# Patient Record
Sex: FEMALE | Race: WHITE | URBAN - METROPOLITAN AREA
[De-identification: names, ages, dates, MRNs, and addresses within clinical notes are randomized per-mention and may not be internally consistent; named-entity substitution may affect disease eponyms.]

---

## 2017-02-26 ENCOUNTER — OFFICE VISIT (OUTPATIENT)
Dept: URGENT CARE | Facility: URGENT CARE | Age: 42
End: 2017-02-26
Payer: COMMERCIAL

## 2017-02-26 VITALS
OXYGEN SATURATION: 99 % | SYSTOLIC BLOOD PRESSURE: 113 MMHG | HEART RATE: 92 BPM | DIASTOLIC BLOOD PRESSURE: 66 MMHG | WEIGHT: 114 LBS | TEMPERATURE: 99.1 F

## 2017-02-26 DIAGNOSIS — K61.1 PERIRECTAL ABSCESS: Primary | ICD-10-CM

## 2017-02-26 PROCEDURE — 99203 OFFICE O/P NEW LOW 30 MIN: CPT | Performed by: FAMILY MEDICINE

## 2017-02-26 RX ORDER — CLINDAMYCIN HCL 300 MG
300 CAPSULE ORAL 4 TIMES DAILY
Qty: 40 CAPSULE | Refills: 0 | Status: SHIPPED | OUTPATIENT
Start: 2017-02-26 | End: 2017-07-14

## 2017-02-26 RX ORDER — TOPIRAMATE 25 MG/1
TABLET, FILM COATED ORAL 2 TIMES DAILY
COMMUNITY
End: 2017-07-14

## 2017-02-26 NOTE — MR AVS SNAPSHOT
After Visit Summary   2/26/2017    Clau Jones    MRN: [id number]           Patient Information     Date Of Birth          1975        Visit Information        Provider Department      2/26/2017 12:05 PM Dar Iniguez MD [facility]        Today's Diagnoses     Perirectal cellulitis/abscess    -  1      Care Instructions      Perianal Abscess, Antibiotic Treatment     Glands near the anus can become blocked. This can lead to infection. If the infection cannot drain, a collection of pus called an abscess may form. Symptoms of an abscess include pain, itching, swelling, and fever.  Antibiotics are given to treat the infection. If the infection does not improve with antibiotic treatment, the abscess will have to be drained by a healthcare provider.  Home care    Take all of the antibiotic medicine as prescribed. Continue it even if you start feeling better. All of the medicine should be finished unless your healthcare provider tells you to stop.    Try sitz baths. Sit in a tub filled with about 6 inches of hot water for 15-30 minutes. (Test the water temperature before sitting down to ensure it will not burn you.) Repeat this twice a day until pain is relieved.    Unless a pain medicine has been prescribed, you may take an over-the-counter medicine, such as ibuprofen, for pain.     Passing stools may be painful. If so, ask your healthcare provider about using a stool-softener for a short time.  Follow-up care  Follow up with your doctor or as advised by our staff.  When to seek medical advice  Call your health care provider right away if any of the following occur:    Increasing pain, swelling, or redness    Pus draining from the abscess    Fever of 100.4 F (38 C) or higher that continues for a day after starting antibiotics, or as directed by your healthcare provider    0440-1784 [employer]. [address] [place] [address], [place] [address]. All rights reserved.  "This information is not intended as a substitute for professional medical care. Always follow your healthcare professional's instructions.              Follow-ups after your visit        Who to contact     If you have questions or need follow up information about today's clinic visit or your schedule please contact [facility] directly at [phone].  Normal or non-critical lab and imaging results will be communicated to you by MyChart, letter or phone within 4 business days after the clinic has received the results. If you do not hear from us within 7 days, please contact the clinic through MyChart or phone. If you have a critical or abnormal lab result, we will notify you by phone as soon as possible.  Submit refill requests through [employer] or call your pharmacy and they will forward the refill request to us. Please allow 3 business days for your refill to be completed.          Additional Information About Your Visit        MyChart Information     [employer] lets you send messages to your doctor, view your test results, renew your prescriptions, schedule appointments and more. To sign up, go to [email or web address] . Click on \"Log in\" on the left side of the screen, which will take you to the Welcome page. Then click on \"Sign up Now\" on the right side of the page.     You will be asked to enter the access code listed below, as well as some personal information. Please follow the directions to create your username and password.     Your access code is: [email or web address]  Expires: 2017 12:53 PM     Your access code will  in 90 days. If you need help or a new code, please call your [facility] or [phone].        Care EveryWhere ID     This is your Care EveryWhere ID. This could be used by other organizations to access your [place] medical records  [id number]        Your Vitals Were     Pulse Temperature Pulse Oximetry             92 99.1  F (37.3  C) (Oral) 99%          Blood " Pressure from Last 3 Encounters:   02/26/17 113/66    Weight from Last 3 Encounters:   02/26/17 114 lb (51.7 kg)              Today, you had the following     No orders found for display       Primary Care Provider Office Phone # Fax #    [facility] [place] [phone] [address] [facility] [address]        Thank you!     Thank you for choosing [facility]  for your care. Our goal is always to provide you with excellent care. Hearing back from our patients is one way we can continue to improve our services. Please take a few minutes to complete the written survey that you may receive in the mail after your visit with us. Thank you!             Your Updated Medication List - Protect others around you: Learn how to safely use, store and throw away your medicines at www.disposemymeds.org.          This list is accurate as of: 2/26/17 12:53 PM.  Always use your most recent med list.                   Brand Name Dispense Instructions for use    TOPAMAX 25 MG tablet   Generic drug:  topiramate      Take by mouth 2 times daily

## 2017-02-26 NOTE — PATIENT INSTRUCTIONS
Perianal Abscess, Antibiotic Treatment     Glands near the anus can become blocked. This can lead to infection. If the infection cannot drain, a collection of pus called an abscess may form. Symptoms of an abscess include pain, itching, swelling, and fever.  Antibiotics are given to treat the infection. If the infection does not improve with antibiotic treatment, the abscess will have to be drained by a healthcare provider.  Home care    Take all of the antibiotic medicine as prescribed. Continue it even if you start feeling better. All of the medicine should be finished unless your healthcare provider tells you to stop.    Try sitz baths. Sit in a tub filled with about 6 inches of hot water for 15-30 minutes. (Test the water temperature before sitting down to ensure it will not burn you.) Repeat this twice a day until pain is relieved.    Unless a pain medicine has been prescribed, you may take an over-the-counter medicine, such as ibuprofen, for pain.     Passing stools may be painful. If so, ask your healthcare provider about using a stool-softener for a short time.  Follow-up care  Follow up with your doctor or as advised by our staff.  When to seek medical advice  Call your health care provider right away if any of the following occur:    Increasing pain, swelling, or redness    Pus draining from the abscess    Fever of 100.4 F (38 C) or higher that continues for a day after starting antibiotics, or as directed by your healthcare provider    9117-5884 [employer] [address] [place] [address]. All rights reserved. This information is not intended as a substitute for professional medical care. Always follow your healthcare professional's instructions.

## 2017-02-26 NOTE — PROGRESS NOTES
SUBJECTIVE:                                                    Clau Jones is a 41 year old female who presents to clinic today for the following health issues:    Rectal Pain       Duration: 1 week     Description (location/character/radiation): rectal     Intensity:  severe    Accompanying signs and symptoms: quarter size bump    History (similar episodes/previous evaluation): hx of hemorrhoids     Precipitating or alleviating factors: None    Therapies tried and outcome: hydrocortisone and ice with no relief     Leaving to [place] for work tomorrow      Painful swelling in the butt on the left side. Been hard to sit.    Problem list and histories reviewed & adjusted, as indicated.  Additional history: as documented    There is no problem list on file for this patient.    No past surgical history on file.    Social History   Substance Use Topics     Smoking status: Not on file     Smokeless tobacco: Not on file     Alcohol use Not on file     No family history on file.        ROS:  Constitutional, HEENT, cardiovascular, pulmonary and gu systems are negative, except as otherwise noted.    OBJECTIVE:                                                    /66  Pulse 92  Temp 99.1  F (37.3  C) (Oral)  Wt 114 lb (51.7 kg)  SpO2 99%  There is no height or weight on file to calculate BMI.  GENERAL: healthy, alert and no distress  NECK: no adenopathy and thyroid normal to palpation  RESP: lungs clear to auscultation - no rales, rhonchi or wheezes  CV: regular rate and rhythm, no murmur, click or rub,  RECTAL (female):     Tender induration on the left upper perianal area, about 2 in x 2 in with deep induration, no fluctuance.  normal sphincter tone, no rectal masses  MS: no gross musculoskeletal defects noted, no edema       ASSESSMENT/PLAN:                                                    (K61.1) Perirectal cellulitis/abscess, Active  (primary encounter diagnosis)  Comment: Treatment with antibiotic.  Discussed the possibility of this evolving into and abscess which might require I and D. Meanwhile will do antibiotic, if worsens will get in to be seen otherwise follow with surgery towards end of the week.  Plan: clindamycin (CLEOCIN) 300 MG capsule, GENERAL         SURG ADULT REFERRAL    Dar Iniguez MD  [facility]

## 2017-02-26 NOTE — NURSING NOTE
Chief Complaint   Patient presents with     Rectal Problem       Initial /66  Pulse 92  Temp 99.1  F (37.3  C) (Oral)  Wt 114 lb (51.7 kg)  SpO2 99% There is no height or weight on file to calculate BMI.  Medication Reconciliation: complete     LUDIVINA Oseguera

## 2017-02-28 ENCOUNTER — TELEPHONE (OUTPATIENT)
Dept: FAMILY MEDICINE | Facility: CLINIC | Age: 42
End: 2017-02-28

## 2017-02-28 NOTE — TELEPHONE ENCOUNTER
Patient has sore lump on backside. Was seen in [facility] and given Clindamyacin. She is out of town now in [place] and it seems to be getting worse. Please advise. Ok to leave a message.

## 2017-02-28 NOTE — TELEPHONE ENCOUNTER
Patient called back and left voicemail asking if abscess DOES start to drain on it's own, what warning things should she watch for.  Called patient back and left detailed voicemail with below information that is highlighted.  Advised to call back main number if any further questions.   Ara Galindo RN      Perianal Abscess, Incision and Drainage  Glands near the anus can become blocked. This can lead to infection. If the infection cannot drain, a collection of pus called an abscess may form. Symptoms of an abscess include pain, itching, swelling, and fever.  Treatment of this infection has required an incision to drain the pus from the abscess. A gauze packing may have been put into the abscess opening. This should be removed within 1-2 days. if it falls out sooner, do not try to put it back in. Treatment with antibiotics may or may not be needed.  Healing of the wound will take about 1 to 2 weeks, depending on the size of the abscess.  Home care    The wound may drain for the first several days. Cover the wound with a clean dry bandage. Change the dressing if it becomes soaked with blood or pus, or soiled with feces.    If gauze packing was placed inside the abscess cavity, you may be told to remove it yourself. Do this only do it if the doctor told you to. You may do this in the shower. Once the packing is removed, wash the area carefully once a day until the skin opening has closed.     Try sitz baths. Sit in a tub filled with about 6 inches of hot water for 15-30 minutes. (Test the water temperature before sitting down to ensure it will not burn you.) Repeat this twice a day until pain is relieved.    If you were prescribed antibiotics, take all of the medicine as prescribed. Continue it even if you start feeling better. All of the medicine should be finished unless your healthcare provider tells you to stop.    Unless a pain medicine has been prescribed, you may take an over-the-counter medicine, such as  ibuprofen, for pain.    Passing stools may be painful. If so, ask your healthcare provider about using a stool-softener for a short time.  Follow-up care  Follow up with your healthcare provider as advised by our staff.  When to seek medical advice  Call your health care provider if any of the following occur:    Increasing pain, swelling, or redness    Pus continuing to drain from the wound 5 days after the incision    Fever of 100.4 F (38 C) or higher, or as directed by your healthcare provider    2284-9931 [employer]. [address] [place] [address], [place] [address]. All rights reserved. This information is not intended as a substitute for professional medical care. Always follow your healthcare professional's instructions.

## 2017-02-28 NOTE — TELEPHONE ENCOUNTER
Ok per patient to leave detailed voicemail on her cell if she is unable to answer.     Called patient and left detailed voicemail with providers message below.  If questions, patient can call clinic back.    Ara Galindo RN

## 2017-02-28 NOTE — TELEPHONE ENCOUNTER
Warm tub soaks or go to an Urgent Care in [facility].They may be able to give her pain meds Not much we can do from here.    Nohemy Gandhi PA-C

## 2017-02-28 NOTE — TELEPHONE ENCOUNTER
Patient called back and states that she was seen 2 days ago for hard lump in buttock area.  Provider said it had not yet turned into an abscess.  She has been taking clindamycin x 2 day. Afebrile so far but having some chills.     This morning patient woke up and area has now developed an abscess that is bigger, and a white/greyish area that is soft in the center.  It is much more painful today than the last 2 days.    Patient in [place] for work and forced to sit in meetings all day which are extremely painful for her. Ibuprofen is not improving the pain at all.   Patient has surgery consult scheduled this Friday for consult on abscess but needs to finish out meetings in [place] until she is able to come back. Very uncomfortable riding in plane also.    Routing to provider to advise if there is something that can be done to get her to this Friday to see surgery or does she need to go to ER out there?    Ara Galindo RN

## 2017-02-28 NOTE — TELEPHONE ENCOUNTER
Left message on voicemail for patient to call back and leave details on voicemail if unable to reach RN.  Direct number given.     Ara Galindo RN

## 2017-03-03 ENCOUNTER — OFFICE VISIT (OUTPATIENT)
Dept: FAMILY MEDICINE | Facility: CLINIC | Age: 42
End: 2017-03-03
Payer: COMMERCIAL

## 2017-03-03 ENCOUNTER — OFFICE VISIT (OUTPATIENT)
Dept: SURGERY | Facility: CLINIC | Age: 42
End: 2017-03-03
Payer: COMMERCIAL

## 2017-03-03 VITALS
BODY MASS INDEX: 19.29 KG/M2 | WEIGHT: 120 LBS | HEART RATE: 86 BPM | SYSTOLIC BLOOD PRESSURE: 119 MMHG | HEIGHT: 66 IN | DIASTOLIC BLOOD PRESSURE: 77 MMHG

## 2017-03-03 VITALS
OXYGEN SATURATION: 100 % | TEMPERATURE: 99.1 F | RESPIRATION RATE: 14 BRPM | HEIGHT: 66 IN | DIASTOLIC BLOOD PRESSURE: 66 MMHG | SYSTOLIC BLOOD PRESSURE: 116 MMHG | BODY MASS INDEX: 19.29 KG/M2 | WEIGHT: 120 LBS | HEART RATE: 64 BPM

## 2017-03-03 DIAGNOSIS — Z01.818 PREOP GENERAL PHYSICAL EXAM: Primary | ICD-10-CM

## 2017-03-03 DIAGNOSIS — K61.0 PERIANAL ABSCESS: Primary | ICD-10-CM

## 2017-03-03 LAB
ANION GAP SERPL CALCULATED.3IONS-SCNC: 10 MMOL/L (ref 3–14)
BUN SERPL-MCNC: 11 MG/DL (ref 7–30)
CALCIUM SERPL-MCNC: 9.3 MG/DL (ref 8.5–10.1)
CHLORIDE SERPL-SCNC: 109 MMOL/L (ref 94–109)
CO2 SERPL-SCNC: 24 MMOL/L (ref 20–32)
CREAT SERPL-MCNC: 0.86 MG/DL (ref 0.52–1.04)
GFR SERPL CREATININE-BSD FRML MDRD: 73 ML/MIN/1.7M2
GLUCOSE SERPL-MCNC: 97 MG/DL (ref 70–99)
HGB BLD-MCNC: 10.9 G/DL (ref 11.7–15.7)
POTASSIUM SERPL-SCNC: 4.2 MMOL/L (ref 3.4–5.3)
SODIUM SERPL-SCNC: 143 MMOL/L (ref 133–144)

## 2017-03-03 PROCEDURE — 36415 COLL VENOUS BLD VENIPUNCTURE: CPT | Performed by: INTERNAL MEDICINE

## 2017-03-03 PROCEDURE — 85018 HEMOGLOBIN: CPT | Performed by: INTERNAL MEDICINE

## 2017-03-03 PROCEDURE — 99244 OFF/OP CNSLTJ NEW/EST MOD 40: CPT | Performed by: SURGERY

## 2017-03-03 PROCEDURE — 99214 OFFICE O/P EST MOD 30 MIN: CPT | Performed by: INTERNAL MEDICINE

## 2017-03-03 PROCEDURE — 80048 BASIC METABOLIC PNL TOTAL CA: CPT | Performed by: INTERNAL MEDICINE

## 2017-03-03 RX ORDER — SUMATRIPTAN 100 MG/1
100 TABLET, FILM COATED ORAL PRN
COMMUNITY
Start: 2014-11-12

## 2017-03-03 ASSESSMENT — PAIN SCALES - GENERAL: PAINLEVEL: NO PAIN (0)

## 2017-03-03 NOTE — PROGRESS NOTES
"Dear Dr. Crane  Thanks for the consult on this patient. For perirectal abscess  I have seen Clau Jones and as you know his chief complaint is a hard lump close to the upper area between the buttocks.  Was going closer to the anus.  No pain with bowel movement.    Was started on antibiotics, but not helping.  Went to  the emergency room in [place], and was drained.  Just last Tuesday about 3 days ago.    First time like this.  Sometimes has noticed a small bump pea sized at the area it started.    Patient did have some chills and fever.      HPI:  Patient is a 41 year old female  with complaints see above  The symptoms started 2 weeks  Patient has not family history of colon cancer  draining makes the episode better.      Review Of Systems  Respiratory: No shortness of breath, dyspnea on exertion, cough, or hemoptysis  Cardiovascular: negative  Gastrointestinal: some constipation  Endocrine: negative  Patient is not taking fiber supplements is takingmira lax and probiotics  /77  Pulse 86  Ht 1.676 m (5' 6\")  Wt 54.4 kg (120 lb)  BMI 19.37 kg/m2      No past medical history on file.    No past surgical history on file.    Current Outpatient Prescriptions   Medication Sig Dispense Refill     topiramate (TOPAMAX) 25 MG tablet Take by mouth 2 times daily Reported on 3/3/2017       clindamycin (CLEOCIN) 300 MG capsule Take 1 capsule (300 mg) by mouth 4 times daily 40 capsule 0       Social History     Social History     Marital status:      Spouse name: N/A     Number of children: N/A     Years of education: N/A     Occupational History     Not on file.     Social History Main Topics     Smoking status: Never Smoker     Smokeless tobacco: Not on file     Alcohol use Not on file     Drug use: Not on file     Sexual activity: Not on file     Other Topics Concern     Not on file     Social History Narrative     No narrative on file       Physical exam:  Patient able to get up on table without " difficulty.  Head eyes, nose and mouth within normal limits.  No supraclavicular or cervical adenopathy palpated.  Thyroid within normal limits.  No carotid bruits auscultated.  Lungs are clear to auscultation  Heart is regular rate and rhythm with no murmur or thrills noted.  No costal vertebral angle tenderness noted.  Abdomen is abdomen is soft without significant tenderness, masses, organomegaly or guarding  bowel sounds are positive and no caput medusa noted.  No obvious hernias ventral hernia noted.  Easily palpable posterior tibial pulse or dorsalis pedis pulse bilaterally.  Lower extremity edema is not present.    Skin pink and warm  Normal affect  Rectal exam:nontneder.  Has left sided perianal area that is swollen with a wick coming out of a inferior hole made in the emergency room.   Not an obvious tract at this time     assessment:Rectal exam:nontneder.  Has left sided perianal area that is swollen with a wick coming out of a inferior hole made in the emergency room.   Not an obvious tract at this time   Plan under  General anesthesia  Will do anal exam if has anal fistula will open tract if has too much involvement of anal sphincter will place seton.  Will open and leave open the perirectal abscess and tract.             Time spent with the patient with greater that 50% of the time in discussion was 40 minutes. Discussing surgery and possible seton and anal fistula with possible damage to the sphincter and anal incontinence.        Dar De Leon MD

## 2017-03-03 NOTE — MR AVS SNAPSHOT
After Visit Summary   3/3/2017    Clau Jones    MRN: [id number]           Patient Information     Date Of Birth          1975        Visit Information        Provider Department      3/3/2017 9:30 AM Dar De Leon MD [facility]        Today's Diagnoses     Perianal abscess    -  1      Care Instructions    Rectal exam:nontneder.  Has left sided perianal area that is swollen with a wick coming out of a inferior hole made in the emergency room.   Not an obvious tract at this time   Will leave wound open to allow it to heal from inside out.      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      >HEMORROIDECTOMY DISCHARGE INSTRUCTIONS  >DR. DAR DE LEON  1. You may resume your regular diet when you feel you are ready to. DO NOT drink alcoholic beverages for  24 hours of while you are taking prescription medication.>  2. Limit your activities for the first 48 hours. Gradually, increase them as tolerated. You may use stairs.>  I encourage you to walk as tolerated.   3. You will have some discomfort at the incision sites. This is expected. This should improve over the next  2-3 days. Ice and pain medication will help with this pain. Use prescribed pain medication as instructed.>  4. Bruising and mild swelling is normal after surgery. The area below and around the incision(s) will be hard and  elevated. This is normal. I call it the healing ridge. This will resolve slowly over the next several months.  If you feel the pain is increasing and cannot explain it by increasing activity please call us at [phone]>  5. The dressing will often have some blood on it. You may shower 24 hours after surgery. Clean gently over  incision site. If clear plastic covering or steri-strip comes off and there is still some bleeding or drainage  then cover with gauze or band-aid. If no bleeding there is no reason to cover site. The  abdominal binder  may be removed after 24 hours after surgery. You may continue to wear it however for comfort. I suggest  you wear an old shayla shirt under the abdominal binder for a more comfortable wear.>  6. Avoid Aspirin for the first 72 hours after the procedure. This medication may increase the tendency to bleed.>  7. Use the following medications (in addition to your normal meds) as shown:>  Name of Medicine Dose Frequency Reason  a. Percocet 5 mg 1-2 every 6 hours as needed for pain. This contains 500 mg of Tylenol (acetaminophen)  per tablet. For example, you may take 1 Percocet and 1 Tylenol, or 2 Percocet and no Tylenol,  or 2 Tylenol and no Percocet every 6 hours.>  b. Tylenol (acetaminophen) 500 mg every 6 hours as needed for pain. Do not take more than 1000 mg  every 6 hours. (see above)>  c. Motrin (ibuprophen) 200-800 mg every 6 hours as needed for pain. Take with food.>    8. Notify [facility] at [phone] if:    Your discomfort is not relieved by your pain medication    You have signs of infection such as temperature above 100.5 degrees orally, chills, or  increasing daily discomfort.    Incision site is becoming more red and/or there is purulent drainage.    You have questions or concerns>  9. Please call [phone] to schedule a follow up appointment in __2__ weeks(s)>  10. When taking narcotics (pain medication more than Tylenol (acetaminophen) and Motrin (ibuprophen) it is  important to keep your stools soft to avoid constipation and pain with straining. This is best done by drinking  fluids (nonalcoholic and non-caffeinated) and taking a stool softener i.e. Metamucil or milk of magnesia.  You may be able to use non-narcotics for pain relief especially by the 3rd post- operative day. Hrahmev885 mg  every 6 hours and/or Motrin (ibuprophen) 200-800 mg every 6 hours. Please do not take more than 4 grams  of Tylenol (acetaminophen) per day. Remember your Percocet does have  "Tylenol (acetaminophen) already  in it. If you have a history of stomach ulcers or stomach problems than do not take the Motrin (ibuprophen).  Please take Motrin (ibuprophen) with food to help protect the stomach.>  11. Do not drive or operate heavy machinery for 24 hours after surgery or when taking narcotics. You may  resume driving when feel that you can safely avoid an accident and are not taking narcotics. This is usually  5 to 7 days after surgery. You should not be alone for 24 hours after surgery.>  NS 1955          Follow-ups after your visit        Who to contact     If you have questions or need follow up information about today's clinic visit or your schedule please contact [facility] directly at [phone].  Normal or non-critical lab and imaging results will be communicated to you by GenerationOnehart, letter or phone within 4 business days after the clinic has received the results. If you do not hear from us within 7 days, please contact the clinic through GenerationOnehart or phone. If you have a critical or abnormal lab result, we will notify you by phone as soon as possible.  Submit refill requests through Bsmark or call your pharmacy and they will forward the refill request to us. Please allow 3 business days for your refill to be completed.          Additional Information About Your Visit        Bsmark Information     Bsmark lets you send messages to your doctor, view your test results, renew your prescriptions, schedule appointments and more. To sign up, go to [email or web address] . Click on \"Log in\" on the left side of the screen, which will take you to the Welcome page. Then click on \"Sign up Now\" on the right side of the page.     You will be asked to enter the access code listed below, as well as some personal information. Please follow the directions to create your username and password.     Your access code is: [email or web address]  Expires: 2017 12:53 PM     Your access code will  in " "90 days. If you need help or a new code, please call your [facility] clinic or [phone].        Care EveryWhere ID     This is your Care EveryWhere ID. This could be used by other organizations to access your [facility] medical records  [id number]        Your Vitals Were     Pulse Height BMI (Body Mass Index)             86 1.676 m (5' 6\") 19.37 kg/m2          Blood Pressure from Last 3 Encounters:   03/03/17 119/77   02/26/17 113/66    Weight from Last 3 Encounters:   03/03/17 54.4 kg (120 lb)   02/26/17 51.7 kg (114 lb)              Today, you had the following     No orders found for display       Primary Care Provider Office Phone # Fax #    [facility] [phone] [address] [facility] [address]        Thank you!     Thank you for choosing [facility] [place]  for your care. Our goal is always to provide you with excellent care. Hearing back from our patients is one way we can continue to improve our services. Please take a few minutes to complete the written survey that you may receive in the mail after your visit with us. Thank you!             Your Updated Medication List - Protect others around you: Learn how to safely use, store and throw away your medicines at www.disposemymeds.org.          This list is accurate as of: 3/3/17 10:09 AM.  Always use your most recent med list.                   Brand Name Dispense Instructions for use    clindamycin 300 MG capsule    CLEOCIN    40 capsule    Take 1 capsule (300 mg) by mouth 4 times daily       TOPAMAX 25 MG tablet   Generic drug:  topiramate      Take by mouth 2 times daily Reported on 3/3/2017         "

## 2017-03-03 NOTE — MR AVS SNAPSHOT
After Visit Summary   3/3/2017    Clau Jones    MRN: [id number]           Patient Information     Date Of Birth          1975        Visit Information        Provider Department      3/3/2017 11:30 AM Danilo Gabriel MD [facility]        Today's Diagnoses     Preop general physical exam    -  1      Care Instructions      Before Your Surgery      Call your surgeon if there is any change in your health. This includes signs of a cold or flu (such as a sore throat, runny nose, cough, rash or fever).    Do not smoke, drink alcohol or take over the counter medicine (unless your surgeon or primary care doctor tells you to) for the 24 hours before and after surgery.    If you take prescribed drugs: Follow your doctor s orders about which medicines to take and which to stop until after surgery.    Eating and drinking prior to surgery: follow the instructions from your surgeon    Take a shower or bath the night before surgery. Use the soap your surgeon gave you to gently clean your skin. If you do not have soap from your surgeon, use your regular soap. Do not shave or scrub the surgery site.  Wear clean pajamas and have clean sheets on your bed.     [facility]    If you have any questions regarding to your visit please contact your care team:     Team Pink:   Clinic Hours Telephone Number   Internal Medicine:  Dr. Brittni Whyte NP       7am-7pm  Monday - Thursday   7am-5pm  Fridays  [phone]  (Appointment scheduling available 24/7)    Questions about your visit?  Team Line  [phone]   Urgent Care - [place] - 11am-9pm Monday-Friday Saturday-Sunday- 9am-5pm   [place] - 5pm-9pm Monday-Friday Saturday-Sunday- 9am-5pm  [phone] - [place]  [phone] - [place]       What options do I have for visits at the clinic other than the traditional office visit?  To expand how we care for you,  many of our providers are utilizing electronic visits (e-visits) and telephone visits, when medically appropriate, for interactions with their patients rather than a visit in the clinic.   We also offer nurse visits for many medical concerns. Just like any other service, we will bill your insurance company for this type of visit based on time spent on the phone with your provider. Not all insurance companies cover these visits. Please check with your medical insurance if this type of visit is covered. You will be responsible for any charges that are not paid by your insurance.      E-visits via [employer]:  generally incur a $35.00 fee.  Telephone visits:  Time spent on the phone: *charged based on time that is spent on the phone in increments of 10 minutes. Estimated cost:   5-10 mins $30.00   11-20 mins. $59.00   21-30 mins. $85.00   Use [employer] (secure email communication and access to your chart) to send your primary care provider a message or make an appointment. Ask someone on your Team how to sign up for [employer].    For a Price Quote for your services, please call our Offerama Price Line at [phone].    As always, Thank you for trusting us with your health care needs!      Discharged by MERI Harris          Follow-ups after your visit        Your next 10 appointments already scheduled     Mar 08, 2017   Procedure with Dar De Leon MD   [facility] (--)    [address] [place] [address]   [phone]            Mar 24, 2017 10:00 AM CDT   [facility] Op with Dar De Leon MD   [facility] [place] [facility] [place]    [facility] [facility] [address]  [place] [address]   [phone]              Who to contact     If you have questions or need follow up information about today's clinic visit or your schedule please contact [facility] [place] directly at [phone].  Normal or non-critical lab and imaging results will be communicated to you by Tamia,  "letter or phone within 4 business days after the clinic has received the results. If you do not hear from us within 7 days, please contact the clinic through DB3 Mobile or phone. If you have a critical or abnormal lab result, we will notify you by phone as soon as possible.  Submit refill requests through DB3 Mobile or call your pharmacy and they will forward the refill request to us. Please allow 3 business days for your refill to be completed.          Additional Information About Your Visit        DB3 Mobile Information     DB3 Mobile lets you send messages to your doctor, view your test results, renew your prescriptions, schedule appointments and more. To sign up, go to [email or web address] . Click on \"Log in\" on the left side of the screen, which will take you to the Welcome page. Then click on \"Sign up Now\" on the right side of the page.     You will be asked to enter the access code listed below, as well as some personal information. Please follow the directions to create your username and password.     Your access code is: [email or web address]  Expires: 2017 12:53 PM     Your access code will  in 90 days. If you need help or a new code, please call your [facility] clinic or [phone].        Care EveryWhere ID     This is your Care EveryWhere ID. This could be used by other organizations to access your [facility] medical records  [id number]        Your Vitals Were     Pulse Temperature Respirations Height Last Period Pulse Oximetry    64 99.1  F (37.3  C) (Oral) 14 5' 5.75\" (1.67 m) 2017 (Exact Date) 100%    Breastfeeding? BMI (Body Mass Index)                No 19.52 kg/m2           Blood Pressure from Last 3 Encounters:   17 116/66   17 119/77   17 113/66    Weight from Last 3 Encounters:   17 120 lb (54.4 kg)   17 120 lb (54.4 kg)   17 114 lb (51.7 kg)              We Performed the Following     Basic metabolic panel     Hemoglobin        Primary Care Provider Office " Phone # Fax #    [facility] [phone] [address] [facility] [address]        Thank you!     Thank you for choosing [facility] [place]  for your care. Our goal is always to provide you with excellent care. Hearing back from our patients is one way we can continue to improve our services. Please take a few minutes to complete the written survey that you may receive in the mail after your visit with us. Thank you!             Your Updated Medication List - Protect others around you: Learn how to safely use, store and throw away your medicines at www.disposemymeds.org.          This list is accurate as of: 3/3/17 11:58 AM.  Always use your most recent med list.                   Brand Name Dispense Instructions for use    clindamycin 300 MG capsule    CLEOCIN    40 capsule    Take 1 capsule (300 mg) by mouth 4 times daily       IBUPROFEN PO      Take 200 mg by mouth every 6 hours as needed for moderate pain       SUMAtriptan 100 MG tablet    IMITREX     Take 100 mg by mouth as needed       TOPAMAX 25 MG tablet   Generic drug:  topiramate      Take by mouth 2 times daily Reported on 3/3/2017

## 2017-03-03 NOTE — PATIENT INSTRUCTIONS
Before Your Surgery      Call your surgeon if there is any change in your health. This includes signs of a cold or flu (such as a sore throat, runny nose, cough, rash or fever).    Do not smoke, drink alcohol or take over the counter medicine (unless your surgeon or primary care doctor tells you to) for the 24 hours before and after surgery.    If you take prescribed drugs: Follow your doctor s orders about which medicines to take and which to stop until after surgery.    Eating and drinking prior to surgery: follow the instructions from your surgeon    Take a shower or bath the night before surgery. Use the soap your surgeon gave you to gently clean your skin. If you do not have soap from your surgeon, use your regular soap. Do not shave or scrub the surgery site.  Wear clean pajamas and have clean sheets on your bed.     [facility]    If you have any questions regarding to your visit please contact your care team:     Team Pink:   Clinic Hours Telephone Number   Internal Medicine:  Dr. Brittni Whyte NP       7am-7pm  Monday - Thursday   7am-5pm  Fridays  [phone]  (Appointment scheduling available 24/7)    Questions about your visit?  Team Line  [phone]   Urgent Care - [place] - 11am-9pm Monday-Friday Saturday-Sunday- 9am-5pm   [place] - 5pm-9pm Monday-Friday Saturday-Sunday- 9am-5pm  [phone] - [place]   [phone] - [place]       What options do I have for visits at the clinic other than the traditional office visit?  To expand how we care for you, many of our providers are utilizing electronic visits (e-visits) and telephone visits, when medically appropriate, for interactions with their patients rather than a visit in the clinic.   We also offer nurse visits for many medical concerns. Just like any other service, we will bill your insurance company for this type of visit based on time spent on the phone  with your provider. Not all insurance companies cover these visits. Please check with your medical insurance if this type of visit is covered. You will be responsible for any charges that are not paid by your insurance.      E-visits via Vedicishart:  generally incur a $35.00 fee.  Telephone visits:  Time spent on the phone: *charged based on time that is spent on the phone in increments of 10 minutes. Estimated cost:   5-10 mins $30.00   11-20 mins. $59.00   21-30 mins. $85.00   Use Helios (secure email communication and access to your chart) to send your primary care provider a message or make an appointment. Ask someone on your Team how to sign up for Helios.    For a Price Quote for your services, please call our Consumer Price Line at [phone].    As always, Thank you for trusting us with your health care needs!      Discharged by MERI Harris

## 2017-03-03 NOTE — NURSING NOTE
"Chief Complaint   Patient presents with     Other     abcess on buttocks       Initial /77  Pulse 86  Ht 5' 6\" (1.676 m)  Wt 120 lb (54.4 kg)  BMI 19.37 kg/m2 Estimated body mass index is 19.37 kg/(m^2) as calculated from the following:    Height as of this encounter: 5' 6\" (1.676 m).    Weight as of this encounter: 120 lb (54.4 kg).  Medication Reconciliation: complete   Adina Viveros Cma      "

## 2017-03-03 NOTE — NURSING NOTE
"Chief Complaint   Patient presents with     Pre-Op Exam       Initial /66  Pulse 64  Temp 99.1  F (37.3  C) (Oral)  Resp 14  Ht 5' 5.75\" (1.67 m)  Wt 120 lb (54.4 kg)  LMP 02/28/2017 (Exact Date)  SpO2 100%  Breastfeeding? No  BMI 19.52 kg/m2 Estimated body mass index is 19.52 kg/(m^2) as calculated from the following:    Height as of this encounter: 5' 5.75\" (1.67 m).    Weight as of this encounter: 120 lb (54.4 kg).  Medication Reconciliation: complete   Danyelle Hadley CMA      "

## 2017-03-03 NOTE — LETTER
[facility]  [address] [place] [address]    March 6, 2017    Clau Jones  [address]  [place] [address]          Dear Clau,     Enclosed is a copy of your results. Ok for surgery.    Results for orders placed or performed in visit on 03/03/17   Hemoglobin   Result Value Ref Range    Hemoglobin 10.9 (L) 11.7 - 15.7 g/dL   Basic metabolic panel   Result Value Ref Range    Sodium 143 133 - 144 mmol/L    Potassium 4.2 3.4 - 5.3 mmol/L    Chloride 109 94 - 109 mmol/L    Carbon Dioxide 24 20 - 32 mmol/L    Anion Gap 10 3 - 14 mmol/L    Glucose 97 70 - 99 mg/dL    Urea Nitrogen 11 7 - 30 mg/dL    Creatinine 0.86 0.52 - 1.04 mg/dL    GFR Estimate 73 >60 mL/min/1.7m2    GFR Estimate If Black 88 >60 mL/min/1.7m2    Calcium 9.3 8.5 - 10.1 mg/dL     If you have any questions or concerns, please me or my clinic team at [phone].      Sincerely,      Danilo Gabriel MD/bt

## 2017-03-03 NOTE — PROGRESS NOTES
[facility]  [address]  [place] [address]  [phone]  Dept: [phone]    PRE-OP EVALUATION:  Today's date: 3/3/2017    Clau Jones (: 1975) presents for pre-operative evaluation assessment as requested by Dr. Kline.  She requires evaluation and anesthesia risk assessment prior to undergoing surgery/procedure for treatment of perianal absess .  Proposed procedure: removal of absess    Date of Surgery/ Procedure: 3/8/17  Time of Surgery/ Procedure: 12:30pm  Hospital/Surgical Facility: [facility]  Fax number for surgical facility:   Primary Physician: [facility]  Type of Anesthesia Anticipated: to be determined    Patient has a Health Care Directive or Living Will:  NO    1. NO - Do you have a history of heart attack, stroke, stent, bypass or surgery on an artery in the head, neck, heart or legs?  2. NO - Do you ever have any pain or discomfort in your chest?  3. NO - Do you have a history of  Heart Failure?  4. NO - Are you troubled by shortness of breath when: walking on the level, up a slight hill or at night?  5. NO - Do you currently have a cold, bronchitis or other respiratory infection?  6. NO - Do you have a cough, shortness of breath or wheezing?  7. NO - Do you sometimes get pains in the calves of your legs when you walk?  8. NO - Do you or anyone in your family have previous history of blood clots?  9. NO - Do you or does anyone in your family have a serious bleeding problem such as prolonged bleeding following surgeries or cuts?  10. NO - Have you ever had problems with anemia or been told to take iron pills?  11. NO - Have you had any abnormal blood loss such as black, tarry or bloody stools, or abnormal vaginal bleeding?  12. NO - Have you ever had a blood transfusion?  13. NO - Have you or any of your relatives ever had problems with anesthesia?  14. NO - Do you have sleep apnea, excessive snoring or daytime drowsiness?  15.  NO - Do you have any prosthetic heart valves?  16. NO - Do you have prosthetic joints?  17. NO - Is there any chance that you may be pregnant?      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  Problems all longstanding and stable, except as noted/documented.  See ROS for pertinent symptoms related to these conditions.                                                                                                  .    MEDICAL HISTORY:                                                      Patient Active Problem List    Diagnosis Date Noted     Perianal abscess 03/03/2017     Priority: Medium      History reviewed. No pertinent past medical history.  Past Surgical History   Procedure Laterality Date     Gyn surgery       Breast surgery       Current Outpatient Prescriptions   Medication Sig Dispense Refill     SUMAtriptan (IMITREX) 100 MG tablet Take 100 mg by mouth as needed       IBUPROFEN PO Take 200 mg by mouth every 6 hours as needed for moderate pain       topiramate (TOPAMAX) 25 MG tablet Take by mouth 2 times daily Reported on 3/3/2017       clindamycin (CLEOCIN) 300 MG capsule Take 1 capsule (300 mg) by mouth 4 times daily 40 capsule 0     OTC products: None, except as noted above    Allergies   Allergen Reactions     Codeine Nausea      Latex Allergy: NO    Social History   Substance Use Topics     Smoking status: Never Smoker     Smokeless tobacco: Not on file     Alcohol use Yes      Comment: occ     History   Drug Use No       REVIEW OF SYSTEMS:                                                    C: NEGATIVE for fever, chills, change in weight  E/M: NEGATIVE for ear, mouth and throat problems  R: NEGATIVE for significant cough or SOB  CV: NEGATIVE for chest pain, palpitations or peripheral edema  MUSCULOSKELETAL; persistent  "rectal pain / buttock soreness  Remainder of the review of systems is negative     EXAM:                                                    /66  Pulse 64  Temp 99.1  F (37.3  C) (Oral)  Resp 14  Ht 5' 5.75\" (1.67 m)  Wt 120 lb (54.4 kg)  LMP 02/28/2017 (Exact Date)  SpO2 100%  Breastfeeding? No  BMI 19.52 kg/m2  GENERAL APPEARANCE: healthy, alert and no distress  HENT: ear canals and TM's normal and nose and mouth without ulcers or lesions  NECK: no adenopathy, no asymmetry, masses, or scars and thyroid normal to palpation  RESP: lungs clear to auscultation - no rales, rhonchi or wheezes  CV: regular rate and rhythm, normal S1 S2, no S3 or S4 and no murmur, click or rub   ABDOMEN: soft, nontender, no HSM or masses and bowel sounds normal  NEURO: Normal strength and tone, sensory exam grossly normal, mentation intact and speech normal    DIAGNOSTICS:                                                    No labs or EKG required for low risk surgery (cataract, skin procedure, breast biopsy, etc)    No results for input(s): HGB, PLT, INR, NA, POTASSIUM, CR, A1C in the last 18337 hours.     IMPRESSION:                                                    Reason for surgery/procedure: symptomatic clary-rectal abscess / fistula  Diagnosis/reason for consult: assess and minimize preoperative risk per consulting surgeon     The proposed surgical procedure is considered INTERMEDIATE risk.    REVISED CARDIAC RISK INDEX  The patient has the following serious cardiovascular risks for perioperative complications such as (MI, PE, VFib and 3  AV Block):  No serious cardiac risks  INTERPRETATION: 0 risks: Class I (very low risk - 0.4% complication rate)    The patient has the following additional risks for perioperative complications:  No identified additional risks      ICD-10-CM    1. Preop general physical exam Z01.818 SUMAtriptan (IMITREX) 100 MG tablet     IBUPROFEN PO     Hemoglobin     Basic metabolic panel "       RECOMMENDATIONS:                                                        --Patient is to take all scheduled medications on the day of surgery EXCEPT for modifications listed below.    APPROVAL GIVEN to proceed with proposed procedure, without further diagnostic evaluation       Signed Electronically by: Danilo Gabriel MD    Copy of this evaluation report is provided to requesting physician.    [facility] Preop Guidelines

## 2017-03-03 NOTE — PATIENT INSTRUCTIONS
Rectal exam:nontneder.  Has left sided perianal area that is swollen with a wick coming out of a inferior hole made in the emergency room.   Not an obvious tract at this time   Will leave wound open to allow it to heal from inside out.      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      >HEMORROIDECTOMY DISCHARGE INSTRUCTIONS  >DR. NAA KAHN  1. You may resume your regular diet when you feel you are ready to. DO NOT drink alcoholic beverages for  24 hours of while you are taking prescription medication.>  2. Limit your activities for the first 48 hours. Gradually, increase them as tolerated. You may use stairs.>  I encourage you to walk as tolerated.   3. You will have some discomfort at the incision sites. This is expected. This should improve over the next  2-3 days. Ice and pain medication will help with this pain. Use prescribed pain medication as instructed.>  4. Bruising and mild swelling is normal after surgery. The area below and around the incision(s) will be hard and  elevated. This is normal. I call it the healing ridge. This will resolve slowly over the next several months.  If you feel the pain is increasing and cannot explain it by increasing activity please call us at [phone]>  5. The dressing will often have some blood on it. You may shower 24 hours after surgery. Clean gently over  incision site. If clear plastic covering or steri-strip comes off and there is still some bleeding or drainage  then cover with gauze or band-aid. If no bleeding there is no reason to cover site. The abdominal binder  may be removed after 24 hours after surgery. You may continue to wear it however for comfort. I suggest  you wear an old shayla shirt under the abdominal binder for a more comfortable wear.>  6. Avoid Aspirin for the first 72 hours after the procedure. This medication may increase the tendency to bleed.>  7. Use the following medications (in  addition to your normal meds) as shown:>  Name of Medicine Dose Frequency Reason  a. Percocet 5 mg 1-2 every 6 hours as needed for pain. This contains 500 mg of Tylenol (acetaminophen)  per tablet. For example, you may take 1 Percocet and 1 Tylenol, or 2 Percocet and no Tylenol,  or 2 Tylenol and no Percocet every 6 hours.>  b. Tylenol (acetaminophen) 500 mg every 6 hours as needed for pain. Do not take more than 1000 mg  every 6 hours. (see above)>  c. Motrin (ibuprophen) 200-800 mg every 6 hours as needed for pain. Take with food.>    8. Notify [facility] at [phone] if:    Your discomfort is not relieved by your pain medication    You have signs of infection such as temperature above 100.5 degrees orally, chills, or  increasing daily discomfort.    Incision site is becoming more red and/or there is purulent drainage.    You have questions or concerns>  9. Please call [phone] to schedule a follow up appointment in __2__ weeks(s)>  10. When taking narcotics (pain medication more than Tylenol (acetaminophen) and Motrin (ibuprophen) it is  important to keep your stools soft to avoid constipation and pain with straining. This is best done by drinking  fluids (nonalcoholic and non-caffeinated) and taking a stool softener i.e. Metamucil or milk of magnesia.  You may be able to use non-narcotics for pain relief especially by the 3rd post- operative day. [id number] mg  every 6 hours and/or Motrin (ibuprophen) 200-800 mg every 6 hours. Please do not take more than 4 grams  of Tylenol (acetaminophen) per day. Remember your Percocet does have Tylenol (acetaminophen) already  in it. If you have a history of stomach ulcers or stomach problems than do not take the Motrin (ibuprophen).  Please take Motrin (ibuprophen) with food to help protect the stomach.>  11. Do not drive or operate heavy machinery for 24 hours after surgery or when taking narcotics. You may  resume driving when feel that you can  safely avoid an accident and are not taking narcotics. This is usually  5 to 7 days after surgery. You should not be alone for 24 hours after surgery.>  NS 1950

## 2017-03-05 ENCOUNTER — ANESTHESIA EVENT (OUTPATIENT)
Dept: SURGERY | Facility: AMBULATORY SURGERY CENTER | Age: 42
End: 2017-03-05

## 2017-03-06 NOTE — ANESTHESIA PREPROCEDURE EVALUATION
Physical Exam  Normal systems: cardiovascular, pulmonary and dental    Airway   Mallampati: II  TM distance: >3 FB  Neck ROM: full    Dental     Cardiovascular   Rhythm and rate: regular and normal      Pulmonary    breath sounds clear to auscultation                    Anesthesia Plan      History & Physical Review  History and physical reviewed and following examination; no interval change.    ASA Status:  2 .    NPO Status:  > 6 hours    Plan for General and ETT with Propofol and Intravenous induction. Maintenance will be TIVA.    PONV prophylaxis:  Ondansetron (or other 5HT-3), Dexamethasone or Solumedrol and Scopolamine patch       Postoperative Care  Postoperative pain management:  IV analgesics and Oral pain medications.      Consents  Anesthetic plan, risks, benefits and alternatives discussed with:  Patient..            ANESTHESIA PREOP EVALUATION    PROCEDURE: Procedure(s):  EUA, possible fistulotomy with I&D of abscess and possible seton placement - Wound Class:    - Wound Class:    - Wound Class:     HPI: Clau Jones is a 41 year old female who presents for above procedure.    PAST MEDICAL HISTORY:    Past Medical History   Diagnosis Date     Motion sickness        PAST SURGICAL HISTORY:    Past Surgical History   Procedure Laterality Date     Gyn surgery       Breast surgery         PAST ANESTHESIA HISTORY:     No personal or family h/o anesthesia problems    SOCIAL HISTORY:       Social History   Substance Use Topics     Smoking status: Never Smoker     Smokeless tobacco: Not on file     Alcohol use Yes      Comment: occ       ALLERGIES:     Allergies   Allergen Reactions     Codeine Nausea       MEDICATIONS:       (Not in a hospital admission)    Current Outpatient Prescriptions   Medication Sig Dispense Refill     SUMAtriptan (IMITREX) 100 MG tablet Take 100 mg by mouth as needed       IBUPROFEN PO Take 200 mg by mouth every 6 hours as needed for moderate pain       topiramate  (TOPAMAX) 25 MG tablet Take by mouth 2 times daily Reported on 3/3/2017       clindamycin (CLEOCIN) 300 MG capsule Take 1 capsule (300 mg) by mouth 4 times daily 40 capsule 0       Current Outpatient Prescriptions Ordered in [facility]   Medication Sig Dispense Refill     SUMAtriptan (IMITREX) 100 MG tablet Take 100 mg by mouth as needed       IBUPROFEN PO Take 200 mg by mouth every 6 hours as needed for moderate pain       topiramate (TOPAMAX) 25 MG tablet Take by mouth 2 times daily Reported on 3/3/2017       clindamycin (CLEOCIN) 300 MG capsule Take 1 capsule (300 mg) by mouth 4 times daily 40 capsule 0     No current Epic-ordered facility-administered medications on file.        PHYSICAL EXAM:    Vitals: T Data Unavailable, P Data Unavailable, BP Data Unavailable, R Data Unavailable, SpO2  , Weight Wt Readings from Last 2 Encounters:   03/03/17 54.4 kg (120 lb)   03/03/17 54.4 kg (120 lb)       See doc flowsheet      No Data Recorded    No Data Recorded    No Data Recorded    No Data Recorded    No Data Recorded    No data recorded.  No data recorded.      NPO STATUS: see doc flowsheet    LABS:    BMP:  Recent Labs   Lab Test  03/03/17   1204   NA  143   POTASSIUM  4.2   CHLORIDE  109   CO2  24   BUN  11   CR  0.86   GLC  97   FAUSTINO  9.3       LFTs:   No results for input(s): PROTTOTAL, ALBUMIN, BILITOTAL, ALKPHOS, AST, ALT, BILIDIRECT in the last 69793 hours.    CBC:   Recent Labs   Lab Test  03/03/17   1204   HGB  10.9*       Coags:  No results for input(s): INR, PTT, FIBR in the last 62977 hours.    Imaging:  No orders to display       Phillip Antonio MD  Anesthesiology Staff  Pager [phone]    3/5/2017  9:08 PM

## 2017-03-08 ENCOUNTER — ANESTHESIA (OUTPATIENT)
Dept: SURGERY | Facility: AMBULATORY SURGERY CENTER | Age: 42
End: 2017-03-08

## 2017-03-08 ENCOUNTER — HOSPITAL ENCOUNTER (OUTPATIENT)
Facility: AMBULATORY SURGERY CENTER | Age: 42
Discharge: HOME | End: 2017-03-08
Attending: SURGERY | Admitting: SURGERY
Payer: COMMERCIAL

## 2017-03-08 VITALS
OXYGEN SATURATION: 100 % | DIASTOLIC BLOOD PRESSURE: 67 MMHG | TEMPERATURE: 97.6 F | RESPIRATION RATE: 8 BRPM | SYSTOLIC BLOOD PRESSURE: 95 MMHG

## 2017-03-08 DIAGNOSIS — K61.0 PERIANAL ABSCESS: Primary | ICD-10-CM

## 2017-03-08 LAB — BETA HCG QUAL IFA URINE: NEGATIVE

## 2017-03-08 PROCEDURE — 46050 I&D PERIANAL ABSCESS SUPFC: CPT | Performed by: SURGERY

## 2017-03-08 PROCEDURE — G8916 PT W IV AB GIVEN ON TIME: HCPCS

## 2017-03-08 PROCEDURE — 84703 CHORIONIC GONADOTROPIN ASSAY: CPT | Performed by: STUDENT IN AN ORGANIZED HEALTH CARE EDUCATION/TRAINING PROGRAM

## 2017-03-08 PROCEDURE — G8907 PT DOC NO EVENTS ON DISCHARG: HCPCS

## 2017-03-08 PROCEDURE — 46050 I&D PERIANAL ABSCESS SUPFC: CPT

## 2017-03-08 RX ORDER — LIDOCAINE 40 MG/G
CREAM TOPICAL
Status: DISCONTINUED | OUTPATIENT
Start: 2017-03-08 | End: 2017-03-09 | Stop reason: HOSPADM

## 2017-03-08 RX ORDER — OXYCODONE HYDROCHLORIDE 5 MG/1
5 TABLET ORAL ONCE
Status: COMPLETED | OUTPATIENT
Start: 2017-03-08 | End: 2017-03-08

## 2017-03-08 RX ORDER — CEFAZOLIN SODIUM 2 G/100ML
2 INJECTION, SOLUTION INTRAVENOUS
Status: COMPLETED | OUTPATIENT
Start: 2017-03-08 | End: 2017-03-08

## 2017-03-08 RX ORDER — MEPERIDINE HYDROCHLORIDE 25 MG/ML
12.5 INJECTION INTRAMUSCULAR; INTRAVENOUS; SUBCUTANEOUS
Status: DISCONTINUED | OUTPATIENT
Start: 2017-03-08 | End: 2017-03-09 | Stop reason: HOSPADM

## 2017-03-08 RX ORDER — FENTANYL CITRATE 50 UG/ML
25-50 INJECTION, SOLUTION INTRAMUSCULAR; INTRAVENOUS
Status: DISCONTINUED | OUTPATIENT
Start: 2017-03-08 | End: 2017-03-09 | Stop reason: HOSPADM

## 2017-03-08 RX ORDER — OXYCODONE HYDROCHLORIDE 5 MG/1
5-10 TABLET ORAL ONCE
Status: DISCONTINUED | OUTPATIENT
Start: 2017-03-08 | End: 2017-03-09 | Stop reason: HOSPADM

## 2017-03-08 RX ORDER — ONDANSETRON 2 MG/ML
4 INJECTION INTRAMUSCULAR; INTRAVENOUS EVERY 30 MIN PRN
Status: DISCONTINUED | OUTPATIENT
Start: 2017-03-08 | End: 2017-03-09 | Stop reason: HOSPADM

## 2017-03-08 RX ORDER — SODIUM CHLORIDE, SODIUM LACTATE, POTASSIUM CHLORIDE, CALCIUM CHLORIDE 600; 310; 30; 20 MG/100ML; MG/100ML; MG/100ML; MG/100ML
INJECTION, SOLUTION INTRAVENOUS CONTINUOUS
Status: DISCONTINUED | OUTPATIENT
Start: 2017-03-08 | End: 2017-03-09 | Stop reason: HOSPADM

## 2017-03-08 RX ORDER — FENTANYL CITRATE 50 UG/ML
INJECTION, SOLUTION INTRAMUSCULAR; INTRAVENOUS PRN
Status: DISCONTINUED | OUTPATIENT
Start: 2017-03-08 | End: 2017-03-08

## 2017-03-08 RX ORDER — ONDANSETRON 4 MG/1
4 TABLET, ORALLY DISINTEGRATING ORAL EVERY 30 MIN PRN
Status: DISCONTINUED | OUTPATIENT
Start: 2017-03-08 | End: 2017-03-09 | Stop reason: HOSPADM

## 2017-03-08 RX ORDER — PROPOFOL 10 MG/ML
INJECTION, EMULSION INTRAVENOUS PRN
Status: DISCONTINUED | OUTPATIENT
Start: 2017-03-08 | End: 2017-03-08

## 2017-03-08 RX ORDER — DEXAMETHASONE SODIUM PHOSPHATE 4 MG/ML
INJECTION, SOLUTION INTRA-ARTICULAR; INTRALESIONAL; INTRAMUSCULAR; INTRAVENOUS; SOFT TISSUE PRN
Status: DISCONTINUED | OUTPATIENT
Start: 2017-03-08 | End: 2017-03-08

## 2017-03-08 RX ORDER — GABAPENTIN 300 MG/1
300 CAPSULE ORAL ONCE
Status: COMPLETED | OUTPATIENT
Start: 2017-03-08 | End: 2017-03-08

## 2017-03-08 RX ORDER — OXYCODONE AND ACETAMINOPHEN 5; 325 MG/1; MG/1
1-2 TABLET ORAL EVERY 6 HOURS PRN
Qty: 40 TABLET | Refills: 0 | Status: SHIPPED | OUTPATIENT
Start: 2017-03-08

## 2017-03-08 RX ORDER — BUPIVACAINE HYDROCHLORIDE 2.5 MG/ML
INJECTION, SOLUTION INFILTRATION; PERINEURAL PRN
Status: DISCONTINUED | OUTPATIENT
Start: 2017-03-08 | End: 2017-03-08 | Stop reason: HOSPADM

## 2017-03-08 RX ORDER — LIDOCAINE HYDROCHLORIDE 20 MG/ML
INJECTION, SOLUTION INFILTRATION; PERINEURAL PRN
Status: DISCONTINUED | OUTPATIENT
Start: 2017-03-08 | End: 2017-03-08

## 2017-03-08 RX ORDER — ONDANSETRON 2 MG/ML
INJECTION INTRAMUSCULAR; INTRAVENOUS PRN
Status: DISCONTINUED | OUTPATIENT
Start: 2017-03-08 | End: 2017-03-08

## 2017-03-08 RX ORDER — HYDROMORPHONE HYDROCHLORIDE 1 MG/ML
.3-.5 INJECTION, SOLUTION INTRAMUSCULAR; INTRAVENOUS; SUBCUTANEOUS EVERY 10 MIN PRN
Status: DISCONTINUED | OUTPATIENT
Start: 2017-03-08 | End: 2017-03-09 | Stop reason: HOSPADM

## 2017-03-08 RX ORDER — SCOLOPAMINE TRANSDERMAL SYSTEM 1 MG/1
1 PATCH, EXTENDED RELEASE TRANSDERMAL
Status: DISCONTINUED | OUTPATIENT
Start: 2017-03-08 | End: 2017-03-09 | Stop reason: HOSPADM

## 2017-03-08 RX ORDER — ACETAMINOPHEN 325 MG/1
975 TABLET ORAL ONCE
Status: COMPLETED | OUTPATIENT
Start: 2017-03-08 | End: 2017-03-08

## 2017-03-08 RX ORDER — NALOXONE HYDROCHLORIDE 0.4 MG/ML
.1-.4 INJECTION, SOLUTION INTRAMUSCULAR; INTRAVENOUS; SUBCUTANEOUS
Status: DISCONTINUED | OUTPATIENT
Start: 2017-03-08 | End: 2017-03-09 | Stop reason: HOSPADM

## 2017-03-08 RX ORDER — KETOROLAC TROMETHAMINE 30 MG/ML
INJECTION, SOLUTION INTRAMUSCULAR; INTRAVENOUS PRN
Status: DISCONTINUED | OUTPATIENT
Start: 2017-03-08 | End: 2017-03-08

## 2017-03-08 RX ADMIN — DEXAMETHASONE SODIUM PHOSPHATE 4 MG: 4 INJECTION, SOLUTION INTRA-ARTICULAR; INTRALESIONAL; INTRAMUSCULAR; INTRAVENOUS; SOFT TISSUE at 12:51

## 2017-03-08 RX ADMIN — PROPOFOL 200 MG: 10 INJECTION, EMULSION INTRAVENOUS at 12:40

## 2017-03-08 RX ADMIN — SCOLOPAMINE TRANSDERMAL SYSTEM 1 PATCH: 1 PATCH, EXTENDED RELEASE TRANSDERMAL at 12:39

## 2017-03-08 RX ADMIN — FENTANYL CITRATE 50 MCG: 50 INJECTION, SOLUTION INTRAMUSCULAR; INTRAVENOUS at 12:40

## 2017-03-08 RX ADMIN — OXYCODONE HYDROCHLORIDE 5 MG: 5 TABLET ORAL at 13:52

## 2017-03-08 RX ADMIN — ACETAMINOPHEN 975 MG: 325 TABLET ORAL at 12:03

## 2017-03-08 RX ADMIN — FENTANYL CITRATE 50 MCG: 50 INJECTION, SOLUTION INTRAMUSCULAR; INTRAVENOUS at 12:50

## 2017-03-08 RX ADMIN — CEFAZOLIN SODIUM 2 G: 2 INJECTION, SOLUTION INTRAVENOUS at 12:37

## 2017-03-08 RX ADMIN — SODIUM CHLORIDE, SODIUM LACTATE, POTASSIUM CHLORIDE, CALCIUM CHLORIDE: 600; 310; 30; 20 INJECTION, SOLUTION INTRAVENOUS at 11:59

## 2017-03-08 RX ADMIN — ONDANSETRON 4 MG: 2 INJECTION INTRAMUSCULAR; INTRAVENOUS at 13:17

## 2017-03-08 RX ADMIN — LIDOCAINE HYDROCHLORIDE 40 MG: 20 INJECTION, SOLUTION INFILTRATION; PERINEURAL at 12:40

## 2017-03-08 RX ADMIN — GABAPENTIN 300 MG: 300 CAPSULE ORAL at 12:04

## 2017-03-08 RX ADMIN — KETOROLAC TROMETHAMINE 30 MG: 30 INJECTION, SOLUTION INTRAMUSCULAR; INTRAVENOUS at 13:07

## 2017-03-08 NOTE — DISCHARGE INSTRUCTIONS
>HEMORROIDECTOMY DISCHARGE INSTRUCTIONS  >DR. NAA KAHN  1. You may resume your regular diet when you feel you are ready to. DO NOT drink alcoholic beverages for  24 hours of while you are taking prescription medication.>  2. Limit your activities for the first 48 hours. Gradually, increase them as tolerated. You may use stairs.>  I encourage you to walk as tolerated.   3. You will have some discomfort at the incision sites. This is expected. This should improve over the next  2-3 days. Ice and pain medication will help with this pain. Use prescribed pain medication as instructed.>  4. Bruising and mild swelling is normal after surgery. The area below and around the incision(s) will be hard and  elevated. This is normal. I call it the healing ridge. This will resolve slowly over the next several months.  If you feel the pain is increasing and cannot explain it by increasing activity please call us at [phone]>  5. The dressing will often have some blood on it. You may shower 24 hours after surgery. Clean gently over  incision site. If clear plastic covering or steri-strip comes off and there is still some bleeding or drainage  then cover with gauze or band-aid. If no bleeding there is no reason to cover site. The abdominal binder  may be removed after 24 hours after surgery. You may continue to wear it however for comfort. I suggest  you wear an old shayla shirt under the abdominal binder for a more comfortable wear.>  6. Avoid Aspirin for the first 72 hours after the procedure. This medication may increase the tendency to bleed.>  7. Use the following medications (in addition to your normal meds) as shown:>  Name of Medicine Dose Frequency Reason  a. Percocet 5 mg 1-2 every 6 hours as needed for pain. This contains 500 mg of Tylenol (acetaminophen)  per tablet. For example, you may take 1 Percocet and 1 Tylenol, or 2 Percocet and no Tylenol,  or 2 Tylenol and no Percocet every 6 hours.>  b. Tylenol  (acetaminophen) 500 mg every 6 hours as needed for pain. Do not take more than 1000 mg  every 6 hours. (see above)>  c. Motrin (ibuprophen) 200-800 mg every 6 hours as needed for pain. Take with food.>    8. Notify [facility] at [phone] if:    Your discomfort is not relieved by your pain medication    You have signs of infection such as temperature above 100.5 degrees orally, chills, or  increasing daily discomfort.    Incision site is becoming more red and/or there is purulent drainage.    You have questions or concerns>  9. Please call [phone] to schedule a follow up appointment in __2__ weeks(s)>  10. When taking narcotics (pain medication more than Tylenol (acetaminophen) and Motrin (ibuprophen) it is  important to keep your stools soft to avoid constipation and pain with straining. This is best done by drinking  fluids (nonalcoholic and non-caffeinated) and taking a stool softener i.e. Metamucil or milk of magnesia.  You may be able to use non-narcotics for pain relief especially by the 3rd post- operative day. [id number] mg  every 6 hours and/or Motrin (ibuprophen) 200-800 mg every 6 hours. Please do not take more than 4 grams  of Tylenol (acetaminophen) per day. Remember your Percocet does have Tylenol (acetaminophen) already  in it. If you have a history of stomach ulcers or stomach problems than do not take the Motrin (ibuprophen).  Please take Motrin (ibuprophen) with food to help protect the stomach.>  11. Do not drive or operate heavy machinery for 24 hours after surgery or when taking narcotics. You may  resume driving when feel that you can safely avoid an accident and are not taking narcotics. This is usually  5 to 7 days after surgery. You should not be alone for 24 hours after surgery.>  NS 1955      Pull packing out after taking pain pill after a shower on Friday.  If any bleeding put gauze and sit on an old towel    Have milk of magnesia available at home so that when  you take the pain medications you take 1-2 teaspoons a day to help reduce problems with constipation.      [facility]  Same-Day Surgery   Adult Discharge Orders & Instructions   For 24 hours after surgery  1. Get plenty of rest.  A responsible adult must stay with you for at least 24 hours after you leave the hospital.   2. Do not drive or use heavy equipment.  If you have weakness or tingling, don't drive or use heavy equipment until this feeling goes away.  3. Do not drink alcohol.  4. Avoid strenuous or risky activities.  Ask for help when climbing stairs.   5. You may feel lightheaded.  IF so, sit for a few minutes before standing.  Have someone help you get up.   6. If you have nausea (feel sick to your stomach): Drink only clear liquids such as apple juice, ginger ale, broth or 7-Up.  Rest may also help.  Be sure to drink enough fluids.  Move to a regular diet as you feel able.  7. You may have a slight fever. Call the doctor if your fever is over 100 F (37.7 C) (taken under the tongue) or lasts longer than 24 hours.  8. You may have a dry mouth, a sore throat, muscle aches or trouble sleeping.  These should go away after 24 hours.  9. Do not make important or legal decisions.   Call your doctor for any of the followin.  Signs of infection (fever, growing tenderness at the surgery site, a large amount of drainage or bleeding, severe pain, foul-smelling drainage, redness, swelling).    2. It has been over 8 to 10 hours since surgery and you are still not able to urinate (pass water).    3.  Headache for over 24 hours.    4.  Numbness, tingling or weakness the day after surgery (if you had spinal anesthesia).  To contact a doctor, call _____[phone]______________________

## 2017-03-08 NOTE — IP AVS SNAPSHOT
MRN:[id number]                      After Visit Summary   3/8/2017    Clau Jones    MRN: [id number]           Thank you!     Thank you for choosing [facility] for your care. Our goal is always to provide you with excellent care. Hearing back from our patients is one way we can continue to improve our services. Please take a few minutes to complete the written survey that you may receive in the mail after you visit with us. Thank you!        Patient Information     Date Of Birth          1975        About your hospital stay     You were admitted on:  March 8, 2017 You last received care in the:  [facility]    You were discharged on:  March 8, 2017       Who to Call     For medical emergencies, please call 911.  For non-urgent questions about your medical care, please call your primary care provider or clinic, [phone]  For questions related to your surgery, please call your surgery clinic        Attending Provider     Provider Specialty    Dar De Leon MD Surgery       Primary Care Provider Office Phone # Fax #    [facility] [phone] [address] [facility] [address]        Your next 10 appointments already scheduled     Mar 24, 2017 10:00 AM CDT   Post Op with Dar De Leon MD   [facility] ([address] [address] [phone]              Further instructions from your care team       >HEMORROIDECTOMY DISCHARGE INSTRUCTIONS  >DR. DAR DE LEON  1. You may resume your regular diet when you feel you are ready to. DO NOT drink alcoholic beverages for  24 hours of while you are taking prescription medication.>  2. Limit your activities for the first 48 hours. Gradually, increase them as tolerated. You may use stairs.>  I encourage you to walk as tolerated.   3. You will have some discomfort at the incision sites. This is expected. This  should improve over the next  2-3 days. Ice and pain medication will help with this pain. Use prescribed pain medication as instructed.>  4. Bruising and mild swelling is normal after surgery. The area below and around the incision(s) will be hard and  elevated. This is normal. I call it the healing ridge. This will resolve slowly over the next several months.  If you feel the pain is increasing and cannot explain it by increasing activity please call us at [phone]>  5. The dressing will often have some blood on it. You may shower 24 hours after surgery. Clean gently over  incision site. If clear plastic covering or steri-strip comes off and there is still some bleeding or drainage  then cover with gauze or band-aid. If no bleeding there is no reason to cover site. The abdominal binder  may be removed after 24 hours after surgery. You may continue to wear it however for comfort. I suggest  you wear an old shayla shirt under the abdominal binder for a more comfortable wear.>  6. Avoid Aspirin for the first 72 hours after the procedure. This medication may increase the tendency to bleed.>  7. Use the following medications (in addition to your normal meds) as shown:>  Name of Medicine Dose Frequency Reason  a. Percocet 5 mg 1-2 every 6 hours as needed for pain. This contains 500 mg of Tylenol (acetaminophen)  per tablet. For example, you may take 1 Percocet and 1 Tylenol, or 2 Percocet and no Tylenol,  or 2 Tylenol and no Percocet every 6 hours.>  b. Tylenol (acetaminophen) 500 mg every 6 hours as needed for pain. Do not take more than 1000 mg  every 6 hours. (see above)>  c. Motrin (ibuprophen) 200-800 mg every 6 hours as needed for pain. Take with food.>    8. [facility] at [phone] if:    Your discomfort is not relieved by your pain medication    You have signs of infection such as temperature above 100.5 degrees orally, chills, or  increasing daily discomfort.    Incision site is  becoming more red and/or there is purulent drainage.    You have questions or concerns>  9. Please call [phone] to schedule a follow up appointment in __2__ weeks(s)>  10. When taking narcotics (pain medication more than Tylenol (acetaminophen) and Motrin (ibuprophen) it is  important to keep your stools soft to avoid constipation and pain with straining. This is best done by drinking  fluids (nonalcoholic and non-caffeinated) and taking a stool softener i.e. Metamucil or milk of magnesia.  You may be able to use non-narcotics for pain relief especially by the 3rd post- operative day. [id number] mg  every 6 hours and/or Motrin (ibuprophen) 200-800 mg every 6 hours. Please do not take more than 4 grams  of Tylenol (acetaminophen) per day. Remember your Percocet does have Tylenol (acetaminophen) already  in it. If you have a history of stomach ulcers or stomach problems than do not take the Motrin (ibuprophen).  Please take Motrin (ibuprophen) with food to help protect the stomach.>  11. Do not drive or operate heavy machinery for 24 hours after surgery or when taking narcotics. You may  resume driving when feel that you can safely avoid an accident and are not taking narcotics. This is usually  5 to 7 days after surgery. You should not be alone for 24 hours after surgery.>  NS 1955      Pull packing out after taking pain pill after a shower on Friday.  If any bleeding put gauze and sit on an old towel    Have milk of magnesia available at home so that when you take the pain medications you take 1-2 teaspoons a day to help reduce problems with constipation.      [facility]  Same-Day Surgery   Adult Discharge Orders & Instructions   For 24 hours after surgery  1. Get plenty of rest.  A responsible adult must stay with you for at least 24 hours after you leave the hospital.   2. Do not drive or use heavy equipment.  If you have weakness or tingling, don't drive or use heavy equipment  "until this feeling goes away.  3. Do not drink alcohol.  4. Avoid strenuous or risky activities.  Ask for help when climbing stairs.   5. You may feel lightheaded.  IF so, sit for a few minutes before standing.  Have someone help you get up.   6. If you have nausea (feel sick to your stomach): Drink only clear liquids such as apple juice, ginger ale, broth or 7-Up.  Rest may also help.  Be sure to drink enough fluids.  Move to a regular diet as you feel able.  7. You may have a slight fever. Call the doctor if your fever is over 100 F (37.7 C) (taken under the tongue) or lasts longer than 24 hours.  8. You may have a dry mouth, a sore throat, muscle aches or trouble sleeping.  These should go away after 24 hours.  9. Do not make important or legal decisions.   Call your doctor for any of the followin.  Signs of infection (fever, growing tenderness at the surgery site, a large amount of drainage or bleeding, severe pain, foul-smelling drainage, redness, swelling).    2. It has been over 8 to 10 hours since surgery and you are still not able to urinate (pass water).    3.  Headache for over 24 hours.    4.  Numbness, tingling or weakness the day after surgery (if you had spinal anesthesia).  To contact a doctor, call _____[phone]______________________       Pending Results     No orders found from 3/6/2017 to 3/9/2017.            Admission Information     Date & Time Provider Department Dept. Phone    3/8/2017 Dar De Leon MD [facility] [phone]      Your Vitals Were     Blood Pressure Temperature Respirations Last Period Pulse Oximetry       109/66 97.6  F (36.4  C) (Temporal) 16 2017 (Exact Date) 98%       MyChart Information     [facility] lets you send messages to your doctor, view your test results, renew your prescriptions, schedule appointments and more. To sign up, go to [email or web address] . Click on \"Log in\" on the left side of the screen, which will take " "you to the Welcome page. Then click on \"Sign up Now\" on the right side of the page.     You will be asked to enter the access code listed below, as well as some personal information. Please follow the directions to create your username and password.     Your access code is: [email or web address]  Expires: 2017 12:53 PM     Your access code will  in 90 days. If you need help or a new code, please call your [facility] clinic or [phone].        Care EveryWhere ID     This is your Care EveryWhere ID. This could be used by other organizations to access your [facility] medical records  [id number]           Review of your medicines      START taking        Dose / Directions    oxyCODONE-acetaminophen 5-325 MG per tablet   Commonly known as:  PERCOCET   Used for:  Perianal abscess        Dose:  1-2 tablet   Take 1-2 tablets by mouth every 6 hours as needed   Quantity:  40 tablet   Refills:  0         CONTINUE these medicines which have NOT CHANGED        Dose / Directions    clindamycin 300 MG capsule   Commonly known as:  CLEOCIN   Used for:  Perirectal abscess        Dose:  300 mg   Take 1 capsule (300 mg) by mouth 4 times daily   Quantity:  40 capsule   Refills:  0       IBUPROFEN PO   Used for:  Preop general physical exam        Dose:  200 mg   Take 200 mg by mouth every 6 hours as needed for moderate pain   Refills:  0       SUMAtriptan 100 MG tablet   Commonly known as:  IMITREX   Used for:  Preop general physical exam        Dose:  100 mg   Take 100 mg by mouth as needed   Refills:  0       TOPAMAX 25 MG tablet   Generic drug:  topiramate        Take by mouth 2 times daily Reported on 3/3/2017   Refills:  0            Where to get your medicines      Some of these will need a paper prescription and others can be bought over the counter. Ask your nurse if you have questions.     Bring a paper prescription for each of these medications     oxyCODONE-acetaminophen 5-325 MG per tablet                Protect others " around you: Learn how to safely use, store and throw away your medicines at www.disposemymeds.org.             Medication List: This is a list of all your medications and when to take them. Check marks below indicate your daily home schedule. Keep this list as a reference.      Medications           Morning Afternoon Evening Bedtime As Needed    clindamycin 300 MG capsule   Commonly known as:  CLEOCIN   Take 1 capsule (300 mg) by mouth 4 times daily                                IBUPROFEN PO   Take 200 mg by mouth every 6 hours as needed for moderate pain                                oxyCODONE-acetaminophen 5-325 MG per tablet   Commonly known as:  PERCOCET   Take 1-2 tablets by mouth every 6 hours as needed                                SUMAtriptan 100 MG tablet   Commonly known as:  IMITREX   Take 100 mg by mouth as needed                                TOPAMAX 25 MG tablet   Take by mouth 2 times daily Reported on 3/3/2017   Generic drug:  topiramate

## 2017-03-08 NOTE — ANESTHESIA POSTPROCEDURE EVALUATION
Patient: Clau Jones    Procedure(s):  EUA,  with I&D of rectal abscess ,exploration of anal fistula - Wound Class: IV-Dirty or Infected    Diagnosis:Perirectal abcess  Diagnosis Additional Information: No value filed.    Anesthesia Type:  General, ETT    Note:  Anesthesia Post Evaluation    Patient location during evaluation: PACU  Patient participation: Able to fully participate in evaluation  Level of consciousness: awake and alert  Pain management: adequate  Airway patency: patent  Cardiovascular status: acceptable  Respiratory status: acceptable  Hydration status: acceptable  PONV: none     Anesthetic complications: None          Last vitals:  Vitals:    03/08/17 1340 03/08/17 1345 03/08/17 1400   BP: 102/63 97/68 95/67   Resp: 28 11 8   Temp:      SpO2: 99% 100% 100%         Electronically Signed By: Phillip Antonio MD  March 8, 2017  2:14 PM

## 2017-03-08 NOTE — OP NOTE
POST OPERATIVE NOTE-IMMEDIATE :  Date of Service: 3/8/2017    Preoperative Diagnosis:  Perirectal abcess possible anal fistula    Postoperative Diagnosis: same with no anal fistula found  * No post-op diagnosis entered *    Procedures:  Anal exam under  General anesthesia  Looking for possible anal fistula  With exam and hydrogen peroxide.   And I and D of perianal abscess    Prosthetic Devices: See Nurses note.    Surgeon(s) and Assistants (if any):    Surgeon(s):  Dar De Leon MD  Circulator: Paramjit Viveros RN  Scrub Person: Selvin Fisher    Anesthesia:  General    Drains: None    Specimens: non sent  Did currette the wound    Tissue Removed, Not Sent:  Yes     Complications: none    Findings/Conclusions:  As above  Did have small possible tract but no evidnece of going into the anus or rectum with h2o2    Estimated Blood Loss:  Less than 20 mL.    Condition on discharge from OR:  Satisfactory  [id number]    Dar De Leon MD

## 2017-03-08 NOTE — OP NOTE
DATE OF PROCEDURE:  03/08/2017      PREOPERATIVE DIAGNOSIS:  Left side perirectal abscess, possible anal fistula.      POSTOPERATIVE DIAGNOSIS:  Left side perirectal abscess, possible anal fistula with no obvious connection to the anus.      PROCEDURE:  Exploration of anal area under general anesthesia, irrigation with hydrogen peroxide to find possible fistula, incision and drainage of perirectal abscess.      ANESTHESIA:  General anesthetic with 0.25% Marcaine with epinephrine injected to and then placed in the gauze at the end of the procedure.      ESTIMATED BLOOD LOSS:  Less than 10 mL.      INDICATIONS:  Clau Jones is a 41-year-old female who started having an abscess while she was on her way out to [place] for work.  She did have it opened and drained in the emergency room and a wick was placed.  I saw her on Friday, we decided we would do this under general anesthesia and to examine the area well, made sure she does not have an anal fistula.  Discussed risks and complications including bleeding, infection, damage to the anal sphincter, that we would leave the wound open and possible seton placement and also what to expect after that.  Also discussed that if we do not find an anal fistula and things heal up and recur, we would get an MRI.  The patient understands and would like to proceed.      DESCRIPTION OF PROCEDURE:  The patient was brought to the OR, placed in supine position.  After receiving general anesthesia, was then placed in the jackknife position and the opening where they had done the incision and drainage and placing of a wick was injected with some hydrogen peroxide under pressure and an anal exam was done.  Did not see any bubbling of hydrogen peroxide coming out through any of the anal area.  There was a spot that when we first looked at and it felt like there was a little defect and this was posterior on the sphincter muscle that there might be a defect there, but I never saw  anything come out through that area.  Then I did not see any actual opening so after about 10 minutes of trying this and not seeing anything we opened up the abscess so that it would be able to drain well and curetted the area.  Then I tried to see if we could find a tract and there was a small tract at the inferior aspect of the opening that I had created and this went down about 1 cm, so I placed an Angiocath.  Then tried injection of hydrogen peroxide and nothing was seen.  So at that point, we did not see anything and we decided to stop.  Pressure was applied for the most part for hemostasis.  There was a little bit of cautery used and then the wound was packed with 1-inch iodoform after infiltrating with Marcaine and the iodoform was soaked in Marcaine along with some fluffs and an ABD pad to help hold pressure on the area.  Good chance that this will fall out the first time she goes to the bathroom but not to worry about it.  I will see her back in a couple of weeks, she has got 1 more dose of antibiotics, but I am not sure that she needs anything for this since it really looks like the abscess has been drained well and there is really no cellulitis.         NAA KAHN MD             D: 2017 13:36   T: 2017 16:17   MT: [id number]      Name:     MARNIE GUAJARDO   MRN:      [id number]        Account:        [id number]   :      1975           Procedure Date: 2017      Document: [id number]       cc: Danilo Gabriel MD

## 2017-03-08 NOTE — ANESTHESIA POSTPROCEDURE EVALUATION
Patient: Clau Jones    Procedure(s):  EUA,  with I&D of rectal abscess ,exploration of anal fistula - Wound Class: IV-Dirty or Infected    Diagnosis:Perirectal abcess  Diagnosis Additional Information: No value filed.    Anesthesia Type:  General, ETT    Note:  Anesthesia Post Evaluation    Patient location during evaluation: PACU  Patient participation: Able to fully participate in evaluation  Level of consciousness: awake and alert  Pain management: adequate  Airway patency: patent  Cardiovascular status: acceptable  Respiratory status: acceptable  Hydration status: acceptable  PONV: none     Anesthetic complications: None          Last vitals:  Vitals:    03/08/17 1340 03/08/17 1345 03/08/17 1400   BP: 102/63 97/68 95/67   Resp: 28 11 8   Temp:      SpO2: 99% 100% 100%         Electronically Signed By: Phillip Antonio MD  March 8, 2017  2:12 PM

## 2017-03-08 NOTE — ANESTHESIA CARE TRANSFER NOTE
Patient: Clau Jones    Procedure(s):  EUA,  with I&D of rectal abscess ,exploration of anal fistula - Wound Class: IV-Dirty or Infected    Diagnosis: Perirectal abcess  Diagnosis Additional Information: No value filed.    Anesthesia Type:   General, ETT     Note:  Airway :Room Air  Patient transferred to:PACU  Comments: Patient awake, alert and oriented.  SpO2 98%.  No apparent anesthesia complications.      Vitals: (Last set prior to Anesthesia Care Transfer)    CRNA VITALS  3/8/2017 1302 - 3/8/2017 1335      3/8/2017             Pulse: 82    SpO2: 100 %    Resp Rate (observed): (!)  2                Electronically Signed By: WILMAN Hills CRNA  March 8, 2017  1:35 PM

## 2017-03-24 ENCOUNTER — OFFICE VISIT (OUTPATIENT)
Dept: SURGERY | Facility: CLINIC | Age: 42
End: 2017-03-24
Payer: COMMERCIAL

## 2017-03-24 VITALS
DIASTOLIC BLOOD PRESSURE: 68 MMHG | SYSTOLIC BLOOD PRESSURE: 108 MMHG | HEIGHT: 66 IN | HEART RATE: 81 BPM | WEIGHT: 115 LBS | BODY MASS INDEX: 18.48 KG/M2

## 2017-03-24 DIAGNOSIS — K61.1 PERIRECTAL ABSCESS: Primary | ICD-10-CM

## 2017-03-24 PROCEDURE — 99024 POSTOP FOLLOW-UP VISIT: CPT | Performed by: SURGERY

## 2017-03-24 NOTE — NURSING NOTE
"Chief Complaint   Patient presents with     Post-Op - General Surgery       Initial /68  Pulse 81  Ht 5' 6\" (1.676 m)  Wt 115 lb (52.2 kg)  LMP 02/28/2017 (Exact Date)  BMI 18.56 kg/m2 Estimated body mass index is 18.56 kg/(m^2) as calculated from the following:    Height as of this encounter: 5' 6\" (1.676 m).    Weight as of this encounter: 115 lb (52.2 kg).  Medication Reconciliation: complete   Adina Viveros Cma      "

## 2017-03-24 NOTE — PROGRESS NOTES
Clau is a 41 year old female who is status post i and d of a perirectal abscess with out any obvious connection to the anus with no chills and nofever.      Patient's  Pain is  improving.  Appetite is  improving.    Wound(s)  Is/are   clean dry and filling in with no evidence of infection.        Plan: follow up in 1 month sooner if tissue growing higher than the skin.   If recurs needs MRI to see if any connection to the anus.        Time spent with the patient with greater that 50% of the time in discussion was 15 minutes.  Dar De Leon MD      DATE OF PROCEDURE: 03/08/2017       PREOPERATIVE DIAGNOSIS: Left side perirectal abscess, possible anal fistula.       POSTOPERATIVE DIAGNOSIS: Left side perirectal abscess, possible anal fistula with no obvious connection to the anus.       PROCEDURE: Exploration of anal area under general anesthesia, irrigation with hydrogen peroxide to find possible fistula, incision and drainage of perirectal abscess.       ANESTHESIA: General anesthetic with 0.25% Marcaine with epinephrine injected to and then placed in the gauze at the end of the procedure.       ESTIMATED BLOOD LOSS: Less than 10 mL.       INDICATIONS: Clau Jones is a 41-year-old female who started having an abscess while she was on her way out to [place] for work. She did have it opened and drained in the emergency room and a wick was placed. I saw her on Friday, we decided we would do this under general anesthesia and to examine the area well, made sure she does not have an anal fistula. Discussed risks and complications including bleeding, infection, damage to the anal sphincter, that we would leave the wound open and possible seton placement and also what to expect after that. Also discussed that if we do not find an anal fistula and things heal up and recur, we would get an MRI. The patient understands and would like to proceed.       DESCRIPTION OF PROCEDURE: The patient was brought to the OR,  placed in supine position. After receiving general anesthesia, was then placed in the jackknife position and the opening where they had done the incision and drainage and placing of a wick was injected with some hydrogen peroxide under pressure and an anal exam was done. Did not see any bubbling of hydrogen peroxide coming out through any of the anal area. There was a spot that when we first looked at and it felt like there was a little defect and this was posterior on the sphincter muscle that there might be a defect there, but I never saw anything come out through that area. Then I did not see any actual opening so after about 10 minutes of trying this and not seeing anything we opened up the abscess so that it would be able to drain well and curetted the area. Then I tried to see if we could find a tract and there was a small tract at the inferior aspect of the opening that I had created and this went down about 1 cm, so I placed an Angiocath. Then tried injection of hydrogen peroxide and nothing was seen. So at that point, we did not see anything and we decided to stop. Pressure was applied for the most part for hemostasis. There was a little bit of cautery used and then the wound was packed with 1-inch iodoform after infiltrating with Marcaine and the iodoform was soaked in Marcaine along with some fluffs and an ABD pad to help hold pressure on the area. Good chance that this will fall out the first time she goes to the bathroom but not to worry about it. I will see her back in a couple of weeks, she has got 1 more dose of antibiotics, but I am not sure that she needs anything for this since it really looks like the abscess has been drained well and there is really no cellulitis.           NAA KAHN MD

## 2017-03-24 NOTE — PATIENT INSTRUCTIONS
Clau is a 41 year old female who is status post i and d of a perirectal abscess with out any obvious connection to the anus with no chills and nofever.      Patient's  Pain is  improving.  Appetite is  improving.    Wound(s)  Is/are   clean dry and filling in with no evidence of infection.        Plan: follow up in 1 month sooner if tissue growing higher than the skin.   If recurs needs MRI to see if any connection to the anus.

## 2017-03-24 NOTE — MR AVS SNAPSHOT
"              After Visit Summary   3/24/2017    Clau Jones    MRN: [id number]           Patient Information     Date Of Birth          1975        Visit Information        Provider Department      3/24/2017 10:00 AM Dar De Leon MD [facility]        Care Instructions    Clau is a 41 year old female who is status post i and d of a perirectal abscess with out any obvious connection to the anus with no chills and nofever.      Patient's  Pain is  improving.  Appetite is  improving.    Wound(s)  Is/are   clean dry and filling in with no evidence of infection.        Plan: follow up in 1 month sooner if tissue growing higher than the skin.   If recurs needs MRI to see if any connection to the anus.          Follow-ups after your visit        Who to contact     If you have questions or need follow up information about today's clinic visit or your schedule please contact [facility] directly at [phone].  Normal or non-critical lab and imaging results will be communicated to you by EdÃºkamehart, letter or phone within 4 business days after the clinic has received the results. If you do not hear from us within 7 days, please contact the clinic through EdÃºkamehart or phone. If you have a critical or abnormal lab result, we will notify you by phone as soon as possible.  Submit refill requests through Yoostay or call your pharmacy and they will forward the refill request to us. Please allow 3 business days for your refill to be completed.          Additional Information About Your Visit        EdÃºkamehart Information     Yoostay lets you send messages to your doctor, view your test results, renew your prescriptions, schedule appointments and more. To sign up, go to [email or web address] . Click on \"Log in\" on the left side of the screen, which will take you to the Welcome page. Then click on \"Sign up Now\" on the right side of the page.     You will be asked to enter the access " "code listed below, as well as some personal information. Please follow the directions to create your username and password.     Your access code is: [email or web address]  Expires: 2017  1:53 PM     Your access code will  in 90 days. If you need help or a new code, please call your [place] clinic or [phone].        Care EveryWhere ID     This is your Care EveryWhere ID. This could be used by other organizations to access your [place] medical records  [id number]        Your Vitals Were     Pulse Height Last Period BMI (Body Mass Index)          81 1.676 m (5' 6\") 2017 (Exact Date) 18.56 kg/m2         Blood Pressure from Last 3 Encounters:   17 108/68   17 95/67   17 116/66    Weight from Last 3 Encounters:   17 52.2 kg (115 lb)   17 54.4 kg (120 lb)   17 54.4 kg (120 lb)              Today, you had the following     No orders found for display       Primary Care Provider Office Phone # Fax #    [facility] [phone] [address] [facility] [facility] [address]        Thank you!     Thank you for choosing [facility] [place]  for your care. Our goal is always to provide you with excellent care. Hearing back from our patients is one way we can continue to improve our services. Please take a few minutes to complete the written survey that you may receive in the mail after your visit with us. Thank you!             Your Updated Medication List - Protect others around you: Learn how to safely use, store and throw away your medicines at www.disposemymeds.org.          This list is accurate as of: 3/24/17 10:33 AM.  Always use your most recent med list.                   Brand Name Dispense Instructions for use    clindamycin 300 MG capsule    CLEOCIN    40 capsule    Take 1 capsule (300 mg) by mouth 4 times daily       IBUPROFEN PO      Take 200 mg by mouth every 6 hours as needed for moderate pain Reported on 3/24/2017       " oxyCODONE-acetaminophen 5-325 MG per tablet    PERCOCET    40 tablet    Take 1-2 tablets by mouth every 6 hours as needed       SUMAtriptan 100 MG tablet    IMITREX     Take 100 mg by mouth as needed       TOPAMAX 25 MG tablet   Generic drug:  topiramate      Take by mouth 2 times daily Reported on 3/3/2017

## 2017-04-21 ENCOUNTER — OFFICE VISIT (OUTPATIENT)
Dept: SURGERY | Facility: CLINIC | Age: 42
End: 2017-04-21
Payer: COMMERCIAL

## 2017-04-21 VITALS
HEART RATE: 75 BPM | DIASTOLIC BLOOD PRESSURE: 72 MMHG | BODY MASS INDEX: 18.64 KG/M2 | SYSTOLIC BLOOD PRESSURE: 106 MMHG | WEIGHT: 116 LBS | HEIGHT: 66 IN

## 2017-04-21 DIAGNOSIS — K61.0 PERIANAL ABSCESS: Primary | ICD-10-CM

## 2017-04-21 PROCEDURE — 99214 OFFICE O/P EST MOD 30 MIN: CPT | Performed by: SURGERY

## 2017-04-21 NOTE — NURSING NOTE
"Chief Complaint   Patient presents with     RECHECK       Initial /72  Pulse 75  Ht 5' 6\" (1.676 m)  Wt 116 lb (52.6 kg)  BMI 18.72 kg/m2 Estimated body mass index is 18.72 kg/(m^2) as calculated from the following:    Height as of this encounter: 5' 6\" (1.676 m).    Weight as of this encounter: 116 lb (52.6 kg).  Medication Reconciliation: cuong Viveros Cma      "

## 2017-04-21 NOTE — PROGRESS NOTES
Clau is a 41 year old female who is status post incison and drainage of a perirectal abscess  with no chills and nofever.      Patient's  Pain is  improving.  Appetite is  Improving.  Patient noted that she could feel some bubbles coming from the anus area and has had a little bit of leakage.    She has anteiorly more excess skin presumed had more of hemorrhoids in the past.  Probably while pregnant times 3.    Has had some pain medications for her recent abscess and then after surgery.  Is using mikhail lax occasionally.  1-2 times a week.      Wound(s)  Is/are with no evidence of infection.  Maybe a little protein like material  on wound.  Will have patient do damp to dry at night at site.      Patient may be a candidate for hemorrhoids banding, but would try fiber first.   Would try to band the anterior site.   For your hemorrhoids try using Metamucil or it's generic equivalent 1-2 tablespoons with a large glass of water or juice every day.  Do not spend much time on the toilet.  Do not bring a magazine or book in the bathroom with you when having a bowel movement.  Sitting on the toilet for extended periods will make your hemorrhoids worse.     You can also use flax seed that is easily obtained at your grocery store. Make sure it is ground up and sprinkle 2 tablespoons on your cereal each morning and drink a large glass of water with it.  You can also mix in yogurt, and even bake in bread or muffins.   Flax seed seems to give less gas and does not have any taste.   If these are not working for you I would be glad to see you back in my clinic to discuss other options.  Call [phone] to set up an appointment.    If you want to have the banding drink 1 bottle of magnesium citrate the day before the banding.      If not helping then make an appointment by callin [phone],  for banding and will need 45 minutes. And discussed risks including pain and infection and possible re banding in  future.    Sincerely     Dar De Leon M.D.    Plan: follow up 3-6 weeks      Time spent with the patient with greater that 50% of the time in discussion was 30 minutes.  Dar De Leon MD

## 2017-04-21 NOTE — PATIENT INSTRUCTIONS
Clau is a 41 year old female who is status post incison and drainage of a perirectal abscess  with no chills and nofever.      Patient's  Pain is  improving.  Appetite is  Improving.  Patient noted that she could feel some bubbles coming from the anus area and has had a little bit of leakage.    She has anteiorly more excess skin presumed had more of hemorrhoids in the past.  Probably while pregnant times 3.    Has had some pain medications for her recent abscess and then after surgery.  Is using mikhail lax occasionally.  1-2 times a week.      Wound(s)  Is/are with no evidence of infection.  Maybe a little protein like material  on wound.  Will have patient do damp to dry at night at site.      Patient may be a candidate for hemorrhoids banding, but would try fiber first.   Would try to band the anterior site.   For your hemorrhoids try using Metamucil or it's generic equivalent 1-2 tablespoons with a large glass of water or juice every day.  Do not spend much time on the toilet.  Do not bring a magazine or book in the bathroom with you when having a bowel movement.  Sitting on the toilet for extended periods will make your hemorrhoids worse.     You can also use flax seed that is easily obtained at your grocery store. Make sure it is ground up and sprinkle 2 tablespoons on your cereal each morning and drink a large glass of water with it.  You can also mix in yogurt, and even bake in bread or muffins.   Flax seed seems to give less gas and does not have any taste.   If these are not working for you I would be glad to see you back in my clinic to discuss other options.  Call [phone] to set up an appointment.    If you want to have the banding drink 1 bottle of magnesium citrate the day before the banding.      If not helping then make an appointment by callin [phone],  for banding and will need 45 minutes. And discussed risks including pain and infection and possible re banding in  future.    Sincerely     Dar De Leon M.D.    Plan: follow up 3-6 weeks

## 2017-04-21 NOTE — MR AVS SNAPSHOT
After Visit Summary   4/21/2017    Clau Jones    MRN: [id number]           Patient Information     Date Of Birth          1975        Visit Information        Provider Department      4/21/2017 9:30 AM Dar De Leon MD [facility] Instructions    Clau is a 41 year old female who is status post incison and drainage of a perirectal abscess  with no chills and nofever.      Patient's  Pain is  improving.  Appetite is  Improving.  Patient noted that she could feel some bubbles coming from the anus area and has had a little bit of leakage.    She has anteiorly more excess skin presumed had more of hemorrhoids in the past.  Probably while pregnant times 3.    Has had some pain medications for her recent abscess and then after surgery.  Is using mikhail lax occasionally.  1-2 times a week.      Wound(s)  Is/are with no evidence of infection.  Maybe a little protein like material  on wound.  Will have patient do damp to dry at night at site.      Patient may be a candidate for hemorrhoids banding, but would try fiber first.   Would try to band the anterior site.   For your hemorrhoids try using Metamucil or it's generic equivalent 1-2 tablespoons with a large glass of water or juice every day.  Do not spend much time on the toilet.  Do not bring a magazine or book in the bathroom with you when having a bowel movement.  Sitting on the toilet for extended periods will make your hemorrhoids worse.     You can also use flax seed that is easily obtained at your grocery store. Make sure it is ground up and sprinkle 2 tablespoons on your cereal each morning and drink a large glass of water with it.  You can also mix in yogurt, and even bake in bread or muffins.   Flax seed seems to give less gas and does not have any taste.   If these are not working for you I would be glad to see you back in my clinic to discuss other options.  Call [phone] to set up an  "appointment.    If you want to have the banding drink 1 bottle of magnesium citrate the day before the banding.      If not helping then make an appointment by callin [phone],  for banding and will need 45 minutes. And discussed risks including pain and infection and possible re banding in future.    Sincerely     Dra De Leon M.D.    Plan: follow up 3-6 weeks        Follow-ups after your visit        Who to contact     If you have questions or need follow up information about today's clinic visit or your schedule please contact [facility] directly at [phone].  Normal or non-critical lab and imaging results will be communicated to you by [facility], letter or phone within 4 business days after the clinic has received the results. If you do not hear from us within 7 days, please contact the clinic through [facility] or phone. If you have a critical or abnormal lab result, we will notify you by phone as soon as possible.  Submit refill requests through Trivnet or call your pharmacy and they will forward the refill request to us. Please allow 3 business days for your refill to be completed.          Additional Information About Your Visit        MyCharKnock Knock Information     Trivnet lets you send messages to your doctor, view your test results, renew your prescriptions, schedule appointments and more. To sign up, go to [email or web address] . Click on \"Log in\" on the left side of the screen, which will take you to the Welcome page. Then click on \"Sign up Now\" on the right side of the page.     You will be asked to enter the access code listed below, as well as some personal information. Please follow the directions to create your username and password.     Your access code is: [email or web address]  Expires: 2017  1:53 PM     Your access code will  in 90 days. If you need help or a new code, please call your [facility] or [phone].        Care EveryWhere ID     This is your Care " "EveryWhere ID. This could be used by other organizations to access your [place] medical records  [id number]        Your Vitals Were     Pulse Height BMI (Body Mass Index)             75 1.676 m (5' 6\") 18.72 kg/m2          Blood Pressure from Last 3 Encounters:   04/21/17 106/72   03/24/17 108/68   03/08/17 95/67    Weight from Last 3 Encounters:   04/21/17 52.6 kg (116 lb)   03/24/17 52.2 kg (115 lb)   03/03/17 54.4 kg (120 lb)              Today, you had the following     No orders found for display       Primary Care Provider Office Phone # Fax #    [facility] [phone] [address] [facility] [address]        Thank you!     Thank you for choosing [facility] [place]  for your care. Our goal is always to provide you with excellent care. Hearing back from our patients is one way we can continue to improve our services. Please take a few minutes to complete the written survey that you may receive in the mail after your visit with us. Thank you!             Your Updated Medication List - Protect others around you: Learn how to safely use, store and throw away your medicines at www.disposemymeds.org.          This list is accurate as of: 4/21/17 10:03 AM.  Always use your most recent med list.                   Brand Name Dispense Instructions for use    clindamycin 300 MG capsule    CLEOCIN    40 capsule    Take 1 capsule (300 mg) by mouth 4 times daily       IBUPROFEN PO      Take 200 mg by mouth every 6 hours as needed for moderate pain Reported on 3/24/2017       oxyCODONE-acetaminophen 5-325 MG per tablet    PERCOCET    40 tablet    Take 1-2 tablets by mouth every 6 hours as needed       SUMAtriptan 100 MG tablet    IMITREX     Take 100 mg by mouth as needed       TOPAMAX 25 MG tablet   Generic drug:  topiramate      Take by mouth 2 times daily Reported on 3/3/2017         "

## 2017-05-26 ENCOUNTER — OFFICE VISIT (OUTPATIENT)
Dept: SURGERY | Facility: CLINIC | Age: 42
End: 2017-05-26
Payer: COMMERCIAL

## 2017-05-26 VITALS
SYSTOLIC BLOOD PRESSURE: 115 MMHG | HEIGHT: 66 IN | WEIGHT: 113 LBS | BODY MASS INDEX: 18.16 KG/M2 | DIASTOLIC BLOOD PRESSURE: 73 MMHG | HEART RATE: 96 BPM

## 2017-05-26 DIAGNOSIS — K61.0 PERIANAL ABSCESS: Primary | ICD-10-CM

## 2017-05-26 DIAGNOSIS — K64.4 EXTERNAL HEMORRHOIDS: ICD-10-CM

## 2017-05-26 PROCEDURE — 99214 OFFICE O/P EST MOD 30 MIN: CPT | Performed by: SURGERY

## 2017-05-26 NOTE — MR AVS SNAPSHOT
"              After Visit Summary   5/26/2017    Clau Jones    MRN: [id number]           Patient Information     Date Of Birth          1975        Visit Information        Provider Department      5/26/2017 8:30 AM Dar De Leon MD [facility] Instructions    Clau is a 41 year old female who is status post I and D of a perirectal abscess  with no chills and no fever.      Patient's  Pain is  improving.  Appetite is  improving.    Wound(s)  Is/are   clean dry and filling in with 50 % covered with new skin.  And with no evidence of infection.          Review Of Systems    Patient is taking fiber supplements   /73  Pulse 96  Ht 1.676 m (5' 6\")  Wt 51.3 kg (113 lb)  BMI 18.24 kg/m2      Past Medical History:   Diagnosis Date     Motion sickness        Past Surgical History:   Procedure Laterality Date     BREAST SURGERY       GYN SURGERY       HC DRAIN SKIN ABSCESS SIMPLE/SINGLE N/A 3/8/2017    Procedure: INCISION AND DRAINAGE, ABSCESS, SIMPLE;  Surgeon: Dar De Leon MD;  Location: [facility] OR       Current Outpatient Prescriptions   Medication Sig Dispense Refill     topiramate (TOPAMAX) 25 MG tablet Take by mouth 2 times daily Reported on 3/3/2017       oxyCODONE-acetaminophen (PERCOCET) 5-325 MG per tablet Take 1-2 tablets by mouth every 6 hours as needed (Patient not taking: Reported on 3/24/2017) 40 tablet 0     SUMAtriptan (IMITREX) 100 MG tablet Take 100 mg by mouth as needed       IBUPROFEN PO Take 200 mg by mouth every 6 hours as needed for moderate pain Reported on 3/24/2017       clindamycin (CLEOCIN) 300 MG capsule Take 1 capsule (300 mg) by mouth 4 times daily 40 capsule 0       Social History     Social History     Marital status:      Spouse name: N/A     Number of children: N/A     Years of education: N/A     Occupational History     Not on file.     Social History Main Topics     Smoking status: Never Smoker     Smokeless " tobacco: Not on file     Alcohol use Yes      Comment: occ     Drug use: No     Sexual activity: Yes     Partners: Male     Birth control/ protection: Female Surgical     Other Topics Concern     Parent/Sibling W/ Cabg, Mi Or Angioplasty Before 65f 55m? No     Social History Narrative       Physical exam:  Patient able to get up on table without difficulty.  Head eyes, nose and mouth within normal limits.    Chart reviewed    Assessment: has excess tissue in the anterior and to the right of the anus.  The anoscope shows excess tissue in the area and minimal discomfort so good option for banding.            Discussed treatment for anal discomfort.  The use of fiber and what the options are.  Keeping the area clean.  The use of steroid creams.  Also discussed use of tight fitting work out underwear that will help keep skin from rubbing together.      Discussed internal hemorrhoid banding with risks including bleeding, pain and infection.  And an infection that may need surgery if gets bad enough.  Also discussed the process and what to expect with it.      If you want to have the banding drink 1 bottle of magnesium citrate the day before the banding.      If not helping then make an appointment by cheryl [phone],  for banding and will need 45 minutes. And discussed risks including pain and infection and possible re banding in future.              Dar De Leon MD    Plan: follow up 3-4 weeks and will do banding at same time.    Use antibiotic ointment on wound at night.               Follow-ups after your visit        Who to contact     If you have questions or need follow up information about today's clinic visit or your schedule please contact [facility] directly at [phone].  Normal or non-critical lab and imaging results will be communicated to you by MyChart, letter or phone within 4 business days after the clinic has received the results. If you do not hear from us within 7 days,  "please contact the clinic through [employer] or phone. If you have a critical or abnormal lab result, we will notify you by phone as soon as possible.  Submit refill requests through [employer] or call your pharmacy and they will forward the refill request to us. Please allow 3 business days for your refill to be completed.          Additional Information About Your Visit        [employer] Information     [employer] Sam lets you send messages to your doctor, view your test results, renew your prescriptions, schedule appointments and more. To sign up, go to [email or web address] . Click on \"Log in\" on the left side of the screen, which will take you to the Welcome page. Then click on \"Sign up Now\" on the right side of the page.     You will be asked to enter the access code listed below, as well as some personal information. Please follow the directions to create your username and password.     Your access code is: [email or web address]  Expires: 2017  1:53 PM     Your access code will  in 90 days. If you need help or a new code, please call your [place] clinic or [phone].        Care EveryWhere ID     This is your Care EveryWhere ID. This could be used by other organizations to access your [place] medical records  [id number]        Your Vitals Were     Pulse Height BMI (Body Mass Index)             96 1.676 m (5' 6\") 18.24 kg/m2          Blood Pressure from Last 3 Encounters:   17 115/73   17 106/72   17 108/68    Weight from Last 3 Encounters:   17 51.3 kg (113 lb)   17 52.6 kg (116 lb)   17 52.2 kg (115 lb)              Today, you had the following     No orders found for display       Primary Care Provider Office Phone # Fax #    [facility] [phone] [address] [address] [facility] [address]        Thank you!     Thank you for choosing [facility] [place]  for your care. Our goal is always to provide you with excellent care. Hearing back from " our patients is one way we can continue to improve our services. Please take a few minutes to complete the written survey that you may receive in the mail after your visit with us. Thank you!             Your Updated Medication List - Protect others around you: Learn how to safely use, store and throw away your medicines at www.disposemymeds.org.          This list is accurate as of: 5/26/17  8:55 AM.  Always use your most recent med list.                   Brand Name Dispense Instructions for use    clindamycin 300 MG capsule    CLEOCIN    40 capsule    Take 1 capsule (300 mg) by mouth 4 times daily       IBUPROFEN PO      Take 200 mg by mouth every 6 hours as needed for moderate pain Reported on 3/24/2017       oxyCODONE-acetaminophen 5-325 MG per tablet    PERCOCET    40 tablet    Take 1-2 tablets by mouth every 6 hours as needed       SUMAtriptan 100 MG tablet    IMITREX     Take 100 mg by mouth as needed       TOPAMAX 25 MG tablet   Generic drug:  topiramate      Take by mouth 2 times daily Reported on 3/3/2017

## 2017-05-26 NOTE — PATIENT INSTRUCTIONS
"Clau is a 41 year old female who is status post I and D of a perirectal abscess  with no chills and no fever.      Patient's  Pain is  improving.  Appetite is  improving.    Wound(s)  Is/are   clean dry and filling in with 50 % covered with new skin.  And with no evidence of infection.          Review Of Systems    Patient is taking fiber supplements   /73  Pulse 96  Ht 1.676 m (5' 6\")  Wt 51.3 kg (113 lb)  BMI 18.24 kg/m2      Past Medical History:   Diagnosis Date     Motion sickness        Past Surgical History:   Procedure Laterality Date     BREAST SURGERY       GYN SURGERY       HC DRAIN SKIN ABSCESS SIMPLE/SINGLE N/A 3/8/2017    Procedure: INCISION AND DRAINAGE, ABSCESS, SIMPLE;  Surgeon: Dar De Leon MD;  Location: [facility]       Current Outpatient Prescriptions   Medication Sig Dispense Refill     topiramate (TOPAMAX) 25 MG tablet Take by mouth 2 times daily Reported on 3/3/2017       oxyCODONE-acetaminophen (PERCOCET) 5-325 MG per tablet Take 1-2 tablets by mouth every 6 hours as needed (Patient not taking: Reported on 3/24/2017) 40 tablet 0     SUMAtriptan (IMITREX) 100 MG tablet Take 100 mg by mouth as needed       IBUPROFEN PO Take 200 mg by mouth every 6 hours as needed for moderate pain Reported on 3/24/2017       clindamycin (CLEOCIN) 300 MG capsule Take 1 capsule (300 mg) by mouth 4 times daily 40 capsule 0       Social History     Social History     Marital status:      Spouse name: N/A     Number of children: N/A     Years of education: N/A     Occupational History     Not on file.     Social History Main Topics     Smoking status: Never Smoker     Smokeless tobacco: Not on file     Alcohol use Yes      Comment: occ     Drug use: No     Sexual activity: Yes     Partners: Male     Birth control/ protection: Female Surgical     Other Topics Concern     Parent/Sibling W/ Cabg, Mi Or Angioplasty Before 65f 55m? No     Social History Narrative       Physical exam:  Patient " able to get up on table without difficulty.  Head eyes, nose and mouth within normal limits.    Chart reviewed    Assessment: has excess tissue in the anterior and to the right of the anus.  The anoscope shows excess tissue in the area and minimal discomfort so good option for banding.            Discussed treatment for anal discomfort.  The use of fiber and what the options are.  Keeping the area clean.  The use of steroid creams.  Also discussed use of tight fitting work out underwear that will help keep skin from rubbing together.      Discussed internal hemorrhoid banding with risks including bleeding, pain and infection.  And an infection that may need surgery if gets bad enough.  Also discussed the process and what to expect with it.      If you want to have the banding drink 1 bottle of magnesium citrate the day before the banding.      If not helping then make an appointment by cheryl [phone],  for banding and will need 45 minutes. And discussed risks including pain and infection and possible re banding in future.              Dar De Leon MD    Plan: follow up 3-4 weeks and will do banding at same time.    Use antibiotic ointment on wound at night.

## 2017-05-26 NOTE — NURSING NOTE
"Chief Complaint   Patient presents with     RECHECK       Initial /73  Pulse 96  Ht 5' 6\" (1.676 m)  Wt 113 lb (51.3 kg)  BMI 18.24 kg/m2 Estimated body mass index is 18.24 kg/(m^2) as calculated from the following:    Height as of this encounter: 5' 6\" (1.676 m).    Weight as of this encounter: 113 lb (51.3 kg).  Medication Reconciliation: cuong Viveros Cma      "

## 2017-05-26 NOTE — PROGRESS NOTES
"Clau is a 41 year old female who is status post I and D of a perirectal abscess  with no chills and no fever.      Patient's  Pain is  improving.  Appetite is  improving.    Wound(s)  Is/are   clean dry and filling in with 50 % covered with new skin.  And with no evidence of infection.          Review Of Systems    Patient is taking fiber supplements   /73  Pulse 96  Ht 1.676 m (5' 6\")  Wt 51.3 kg (113 lb)  BMI 18.24 kg/m2      Past Medical History:   Diagnosis Date     Motion sickness        Past Surgical History:   Procedure Laterality Date     BREAST SURGERY       GYN SURGERY       HC DRAIN SKIN ABSCESS SIMPLE/SINGLE N/A 3/8/2017    Procedure: INCISION AND DRAINAGE, ABSCESS, SIMPLE;  Surgeon: Dar De Leon MD;  Location: [facility]       Current Outpatient Prescriptions   Medication Sig Dispense Refill     topiramate (TOPAMAX) 25 MG tablet Take by mouth 2 times daily Reported on 3/3/2017       oxyCODONE-acetaminophen (PERCOCET) 5-325 MG per tablet Take 1-2 tablets by mouth every 6 hours as needed (Patient not taking: Reported on 3/24/2017) 40 tablet 0     SUMAtriptan (IMITREX) 100 MG tablet Take 100 mg by mouth as needed       IBUPROFEN PO Take 200 mg by mouth every 6 hours as needed for moderate pain Reported on 3/24/2017       clindamycin (CLEOCIN) 300 MG capsule Take 1 capsule (300 mg) by mouth 4 times daily 40 capsule 0       Social History     Social History     Marital status:      Spouse name: N/A     Number of children: N/A     Years of education: N/A     Occupational History     Not on file.     Social History Main Topics     Smoking status: Never Smoker     Smokeless tobacco: Not on file     Alcohol use Yes      Comment: occ     Drug use: No     Sexual activity: Yes     Partners: Male     Birth control/ protection: Female Surgical     Other Topics Concern     Parent/Sibling W/ Cabg, Mi Or Angioplasty Before 65f 55m? No     Social History Narrative       Physical exam:  Patient " able to get up on table without difficulty.  Head eyes, nose and mouth within normal limits.    Chart reviewed    Assessment: has excess tissue in the anterior and to the right of the anus.  The anoscope shows excess tissue in the area and minimal discomfort so good option for banding.            Discussed treatment for anal discomfort.  The use of fiber and what the options are.  Keeping the area clean.  The use of steroid creams.  Also discussed use of tight fitting work out underwear that will help keep skin from rubbing together.      Discussed internal hemorrhoid banding with risks including bleeding, pain and infection.  And an infection that may need surgery if gets bad enough.  Also discussed the process and what to expect with it.      If you want to have the banding drink 1 bottle of magnesium citrate the day before the banding.      If not helping then make an appointment by cheryl [phone],  for banding and will need 45 minutes. And discussed risks including pain and infection and possible re banding in future.              Dar De Leon MD    Plan: follow up 3-4 weeks and will do banding at same time.    Use antibiotic ointment on wound at night.     Time spent with the patient with greater that 50% of the time in discussion was 30 minutes.  Dar De Leon MD

## 2017-07-14 ENCOUNTER — OFFICE VISIT (OUTPATIENT)
Dept: SURGERY | Facility: CLINIC | Age: 42
End: 2017-07-14
Payer: COMMERCIAL

## 2017-07-14 VITALS — DIASTOLIC BLOOD PRESSURE: 70 MMHG | HEART RATE: 74 BPM | SYSTOLIC BLOOD PRESSURE: 128 MMHG | OXYGEN SATURATION: 100 %

## 2017-07-14 DIAGNOSIS — K64.8 INTERNAL HEMORRHOIDS WITH COMPLICATION: Primary | ICD-10-CM

## 2017-07-14 PROCEDURE — 46221 LIGATION OF HEMORRHOID(S): CPT | Performed by: SURGERY

## 2017-07-14 ASSESSMENT — PAIN SCALES - GENERAL: PAINLEVEL: NO PAIN (0)

## 2017-07-14 NOTE — MR AVS SNAPSHOT
After Visit Summary   7/14/2017    Clau Jones    MRN: [id number]           Patient Information     Date Of Birth          1975        Visit Information        Provider Department      7/14/2017 9:30 AM Dar De Leon MD [facility]        Today's Diagnoses     Internal hemorrhoids with complication    -  1      Care Instructions    Scheduling Information:  To schedule your CT/MRI scan, please contact [facility] at [phone] or [facility] at [phone].    To schedule your surgery, please contact our Specialty Schedulers at [phone].    ** If a CT scan or biopsy were ordered/done, the Surgeon may need to see you back in clinic to go over the results and discuss treatment optins based on your results.       General Surgery - [place] Location.    If you have any questions regarding to your visit please contact your care team:     [facility] [address] [place] [address]   [address] Dr[address]  Nita Hamilton.  Nita Hamilton.   Adina Viveros, [facility]  Dashawn Keating, [facility]  Romelia Antonio [facility]   Appointment line: [phone]  [facility] Desk: [phone]  Specialty Scheduling (for surgeries only) [phone]     If you need a medication refill please contact your pharmacy.  Please allow 3 business days for your refill to be completed.    As always, Thank you for trusting us with your health care needs!  _____________________________________________________________________    Risks of hemorrhoid banding including, bleeding, pain and less likely risk includes infection.  If patient has increased pain and fevers to go to the emergency room.    Discussed the concern of increasing pain and temperature and chills to go to the emergency room.    If want banding again then just get it set up and drink 1 bottle of magnesium citrate the day before the banding.      If not helping  "then make an appointment by callin [phone],  for banding and will need 45 minutes. And discussed risks including pain and infection and possible re banding in future.            Follow-ups after your visit        Who to contact     If you have questions or need follow up information about today's clinic visit or your schedule please contact [facility] directly at [phone].  Normal or non-critical lab and imaging results will be communicated to you by [employer], letter or phone within 4 business days after the clinic has received the results. If you do not hear from us within 7 days, please contact the clinic through [employer] or phone. If you have a critical or abnormal lab result, we will notify you by phone as soon as possible.  Submit refill requests through [employer] or call your pharmacy and they will forward the refill request to us. Please allow 3 business days for your refill to be completed.          Additional Information About Your Visit        [employer] Information     [employer] lets you send messages to your doctor, view your test results, renew your prescriptions, schedule appointments and more. To sign up, go to [email or web address] . Click on \"Log in\" on the left side of the screen, which will take you to the Welcome page. Then click on \"Sign up Now\" on the right side of the page.     You will be asked to enter the access code listed below, as well as some personal information. Please follow the directions to create your username and password.     Your access code is: [email or web address]  Expires: 10/12/2017 10:18 AM     Your access code will  in 90 days. If you need help or a new code, please call your [facility] clinic or [phone].        Care EveryWhere ID     This is your Care EveryWhere ID. This could be used by other organizations to access your [facility] medical records  [id number]        Your Vitals Were     Pulse Pulse Oximetry                74 100%           Blood " Pressure from Last 3 Encounters:   07/14/17 128/70   05/26/17 115/73   04/21/17 106/72    Weight from Last 3 Encounters:   05/26/17 51.3 kg (113 lb)   04/21/17 52.6 kg (116 lb)   03/24/17 52.2 kg (115 lb)              We Performed the Following     HEMORRHOIDECTOMY W BANDING/LIGATION        Primary Care Provider Office Phone # Fax #    [facility] [phone] [address] [facility] [address]        Equal Access to Services     [facility] campus[facility]IS : Hadii addis ku hadasho Soernesto, waaxda luqadaha, qaybta kaalmada becky, genny lua . So [facility] [phone].    ATENCIÓN: Si habla español, tiene a burton disposición servicios gratuitos de asistencia lingüística. [facility] [phone].    We comply with applicable federal civil rights laws and [place] laws. We do not discriminate on the basis of race, color, national origin, age, disability sex, sexual orientation or gender identity.            Thank you!     Thank you for choosing [facility]  for your care. Our goal is always to provide you with excellent care. Hearing back from our patients is one way we can continue to improve our services. Please take a few minutes to complete the written survey that you may receive in the mail after your visit with us. Thank you!             Your Updated Medication List - Protect others around you: Learn how to safely use, store and throw away your medicines at www.disposemymeds.org.          This list is accurate as of: 7/14/17 10:21 AM.  Always use your most recent med list.                   Brand Name Dispense Instructions for use Diagnosis    IBUPROFEN PO      Take 200 mg by mouth every 6 hours as needed for moderate pain Reported on 3/24/2017    Preop general physical exam       oxyCODONE-acetaminophen 5-325 MG per tablet    PERCOCET    40 tablet    Take 1-2 tablets by mouth every 6 hours as needed    Perianal abscess       SUMAtriptan 100 MG tablet     IMITREX     Take 100 mg by mouth as needed    Preop general physical exam

## 2017-07-14 NOTE — NURSING NOTE
"Chief Complaint   Patient presents with     Procedure     Banding. USed laxitive last night just started movement this am.        Initial /70 (BP Location: Right arm, Patient Position: Chair, Cuff Size: Adult Regular)  Pulse 74  SpO2 100% Estimated body mass index is 18.24 kg/(m^2) as calculated from the following:    Height as of 5/26/17: 5' 6\" (1.676 m).    Weight as of 5/26/17: 113 lb (51.3 kg)..  BP completed using cuff size: regular    Mikala Antonio CMA    "

## 2017-07-14 NOTE — PATIENT INSTRUCTIONS
Scheduling Information:  To schedule your CT/MRI scan, please contact [facility] at [phone] or [facility] at [phone].    To schedule your surgery, please contact our Specialty Schedulers at [phone].    ** If a CT scan or biopsy were ordered/done, the Surgeon may need to see you back in clinic to go over the results and discuss treatment optins based on your results.       General Surgery - [place] Location.    If you have any questions regarding to your visit please contact your care team:     Judie Hamilton [address] [facility] [place] [address]   [address] Dr[address]over.  Nita Hamilton.  Nita Hamilton.   Adina Viveros, [facility]  Dashawn Keating [facility]  Romelia Antonio [facility]   Appointment line: [phone]  [facility] Desk: [phone]  Specialty Scheduling (for surgeries only) [phone]     If you need a medication refill please contact your pharmacy.  Please allow 3 business days for your refill to be completed.    As always, Thank you for trusting us with your health care needs!  _____________________________________________________________________    Risks of hemorrhoid banding including, bleeding, pain and less likely risk includes infection.  If patient has increased pain and fevers to go to the emergency room.    Discussed the concern of increasing pain and temperature and chills to go to the emergency room.    If want banding again then just get it set up and drink 1 bottle of magnesium citrate the day before the banding.      If not helping then make an appointment by cheryl [phone],  for banding and will need 45 minutes. And discussed risks including pain and infection and possible re banding in future.

## 2017-07-14 NOTE — PROGRESS NOTES
Risks of hemorrhoid banding including, bleeding, pain and less likely risk includes infection.  If patient has increased pain and fevers to go to the emergency room.  Consent form signed.  Questions answered.  /70 (BP Location: Right arm, Patient Position: Chair, Cuff Size: Adult Regular)  Pulse 74  SpO2 100%     Patient has excess skin in the left anterior area of the anus andhas trouble keeping the area clean.  So will band it.  May not remove completley.      Procedure  Preop dx: bleeding internal hemorrhoids and excess skin in the anterior anal area causwig problems with keeping area clean.   Post op dx:  Same  Procedure:   Via anal scope banding of hemorroids with rubber band .  Banding of 1 internal hemorrhoids.Patient had no  pain  Est. blood loss: minimal  Patient tolerated procedure well.  Hemostasis obtained with pressure.  Follow up with me as needed  If any problems will use what she had from her surgery  The above procedure was performed by me with patient in the lateral position position  Discussed the concern of increasing pain and temperature and chills to go to the emergency room.    Dar De Leon MD

## 2019-03-11 NOTE — IP AVS SNAPSHOT
[facility]    [address] [place] [place] [address]    Phone:  [phone]                                       After Visit Summary   3/8/2017    Clau Jones    MRN: [id number]           After Visit Summary Signature Page     I have received my discharge instructions, and my questions have been answered. I have discussed any challenges I see with this plan with the nurse or doctor.    ..........................................................................................................................................  Patient/Patient Representative Signature      ..........................................................................................................................................  Patient Representative Print Name and Relationship to Patient    ..................................................               ................................................  Date                                            Time    ..........................................................................................................................................  Reviewed by Signature/Title    ...................................................              ..............................................  Date                                                            Time           40.7

## (undated) DEVICE — GLOVE PROTEXIS W/NEU-THERA 7.5  2D73TE75

## (undated) DEVICE — PANTIES MESH LG/XLG 2PK 706M2

## (undated) DEVICE — SOL NACL 0.9% IRRIG 1000ML BOTTLE 07138-09

## (undated) DEVICE — SU VICRYL 3-0 SH 27" UND J416H

## (undated) DEVICE — NDL 19GA 1.5"

## (undated) DEVICE — PACK MINOR SBA15MIFSE

## (undated) DEVICE — SUCTION TIP YANKAUER W/O VENT K86

## (undated) DEVICE — SU PROLENE 0 CT-1 30" 8424H

## (undated) DEVICE — DRAPE LAP W/ARMBOARD 29410

## (undated) DEVICE — DRSG KERLIX FLUFFS X5

## (undated) DEVICE — GLOVE PROTEXIS BLUE W/NEU-THERA 7.5  2D73EB75

## (undated) DEVICE — SU VICRYL 0 CT-2 27" UND J270H

## (undated) DEVICE — DRSG ABDOMINAL 07 1/2X8" 7197D

## (undated) DEVICE — PREP SKIN SCRUB TRAY 4461A

## (undated) DEVICE — PAD PERI W/WINGS 1580A

## (undated) DEVICE — DRSG GAUZE 4X4" TRAY 6939